# Patient Record
Sex: FEMALE | Race: OTHER | HISPANIC OR LATINO | URBAN - METROPOLITAN AREA
[De-identification: names, ages, dates, MRNs, and addresses within clinical notes are randomized per-mention and may not be internally consistent; named-entity substitution may affect disease eponyms.]

---

## 2021-04-12 ENCOUNTER — EMERGENCY (EMERGENCY)
Facility: HOSPITAL | Age: 36
LOS: 1 days | Discharge: HOME | End: 2021-04-12
Attending: STUDENT IN AN ORGANIZED HEALTH CARE EDUCATION/TRAINING PROGRAM
Payer: COMMERCIAL

## 2021-04-12 VITALS — DIASTOLIC BLOOD PRESSURE: 101 MMHG | SYSTOLIC BLOOD PRESSURE: 156 MMHG

## 2021-04-12 VITALS
WEIGHT: 158.07 LBS | RESPIRATION RATE: 20 BRPM | HEIGHT: 67 IN | HEART RATE: 68 BPM | TEMPERATURE: 98 F | DIASTOLIC BLOOD PRESSURE: 105 MMHG | SYSTOLIC BLOOD PRESSURE: 175 MMHG | OXYGEN SATURATION: 99 %

## 2021-04-12 LAB
APPEARANCE UR: CLEAR — SIGNIFICANT CHANGE UP
BILIRUB UR-MCNC: NEGATIVE — SIGNIFICANT CHANGE UP
COLOR SPEC: YELLOW — SIGNIFICANT CHANGE UP
DIFF PNL FLD: NEGATIVE — SIGNIFICANT CHANGE UP
GLUCOSE UR QL: NEGATIVE MG/DL — SIGNIFICANT CHANGE UP
HCG UR QL: NEGATIVE — SIGNIFICANT CHANGE UP
KETONES UR-MCNC: NEGATIVE — SIGNIFICANT CHANGE UP
LEUKOCYTE ESTERASE UR-ACNC: NEGATIVE — SIGNIFICANT CHANGE UP
NITRITE UR-MCNC: NEGATIVE — SIGNIFICANT CHANGE UP
PH UR: 7 — SIGNIFICANT CHANGE UP (ref 5–8)
PROT UR-MCNC: NEGATIVE MG/DL — SIGNIFICANT CHANGE UP
SP GR SPEC: 1.01 — SIGNIFICANT CHANGE UP (ref 1.01–1.02)
UROBILINOGEN FLD QL: NEGATIVE MG/DL — SIGNIFICANT CHANGE UP

## 2021-04-12 PROCEDURE — 87086 URINE CULTURE/COLONY COUNT: CPT

## 2021-04-12 PROCEDURE — 99283 EMERGENCY DEPT VISIT LOW MDM: CPT

## 2021-04-12 PROCEDURE — 81025 URINE PREGNANCY TEST: CPT

## 2021-04-12 PROCEDURE — 81003 URINALYSIS AUTO W/O SCOPE: CPT

## 2021-04-12 PROCEDURE — 99284 EMERGENCY DEPT VISIT MOD MDM: CPT

## 2021-04-12 RX ORDER — LIDOCAINE 4 G/100G
1 CREAM TOPICAL ONCE
Refills: 0 | Status: COMPLETED | OUTPATIENT
Start: 2021-04-12 | End: 2021-04-12

## 2021-04-12 RX ORDER — LIDOCAINE 4 G/100G
1 CREAM TOPICAL
Qty: 10 | Refills: 0
Start: 2021-04-12

## 2021-04-12 RX ADMIN — LIDOCAINE 1 PATCH: 4 CREAM TOPICAL at 10:58

## 2021-04-12 NOTE — ED PROVIDER NOTE - NS ED ROS FT
Constitutional: no fever, no chills  Eyes: no vision changes  ENT: no nasal congestion, no sore throat  CV: no chest pain  Resp: no cough, no shortness of breath  GI: no abdominal pain, no vomiting, no diarrhea  : no dysuria  MSK: no joint pain, +back pain  Skin: no rash  Neuro: no headache, no weakness, no paresthesias

## 2021-04-12 NOTE — ED PROVIDER NOTE - PHYSICAL EXAMINATION
Constitutional: Awake, alert, in no acute distress  Eyes: no scleral icterus  HENT: normocephalic, atraumatic  Neck: supple  CV: RRR, no murmur  Pulm: non-labored respirations, CTAB  Abdomen: soft, non-tender, non-distended  Back: mild tenderness over lumbar area, no stepoff or deformity  Extremities: no edema, no deformity  Skin: no rash, no jaundice  Neuro: AAOx3, moving all extremities equally, 5/5 strength and sensation in b/l lower extremities

## 2021-04-12 NOTE — ED PROVIDER NOTE - PATIENT PORTAL LINK FT
You can access the FollowMyHealth Patient Portal offered by [facility] by registering at the following website: [email or web address]. By joining [employer]’s FollowMyHealth portal, you will also be able to view your health information using other applications (apps) compatible with our system.

## 2021-04-12 NOTE — ED PROVIDER NOTE - CLINICAL SUMMARY MEDICAL DECISION MAKING FREE TEXT BOX
35y F w/ DM, presenting for 2 months of intermittent low back pain, worse today.  Pain improved with tylenol.  Pt in no distress, no red flag symptoms.  Will check UA, treat with lidocaine patch.

## 2021-04-12 NOTE — ED PROVIDER NOTE - CARE PROVIDER_API CALL
Joaquim Darden (DO)  Orthopaedic Surgery  [address] [place] [address], [address]  [place] [address]  Phone: [phone]  Fax: [phone]  Follow Up Time:

## 2021-04-12 NOTE — ED PROVIDER NOTE - NSFOLLOWUPINSTRUCTIONS_ED_ALL_ED_FT
- FOLLOW UP WITH YOUR PRIMARY CARE DOCTOR AND WITH SPINE DOCTOR  - MAY TAKE TYLENOL AND/OR IBUPROFEN AS NEEDED FOR PAIN  - RETURN TO THE EMERGENCY ROOM FOR ANY NEW SYMPTOMS INCLUDING AS LEG WEAKNESS/NUMBNESS, DIFFICULTY GOING TO THE BATHROOM, FEVER.    ----------------------------------      Distensión lumbar    LO QUE NECESITA SABER:    ¿Qué es la distensión lumbar?La distensión lumbar es esther lesión en los músculos o tendones de la parte inferior de la espalda. Los tendones son los tejidos juan jose que conectan a los músculos con los huesos. La parte inferior de la espalda sostiene la mayor parte de holland peso corporal y facilita holland habilidad de moverse, torcerse y doblarse.    ¿Qué ocasiona la distensión lumbar?La distensión lumbar por lo general es provocada por actividades que aumentan la tensión en la parte inferior de la espalda chet el ejercicio o esther lesión. Los siguientes podrían aumentar holland riesgo para esther distensión lumbar:   •Usted sufrió anteriormente esther distensión lumbar.      •Usted levanta objetos pesados con holland espalda en vez de usar tiny piernas.      •Usted no hace calentamiento antes de hacer ejercicio.      •Usted pasa mucho tiempo parado o sentado.      •Usted tiene sobrepeso.      ¿Cuáles son los signos y síntomas de la distensión lumbar?  •Dolor en la parte inferior de la espalda o espasmos musculares      •Rigidez o movimiento limitado      •Dolor que se desplaza hacia los glúteos, katina o las piernas      •Dolor que empeora con la actividad      ¿Cómo se diagnostica la distensión lumbar?Podría realizarse esther radiografía, esther tomografía computarizada (TC) o esther imagen por resonancia magnética (IRM) para revisar si hay daño en holland columna, músculos o tendones. Es posible que le administren líquido de contraste para que la parte inferior de holland espalda se silvina mejor en las imágenes. Dígale al médico si usted alguna vez ha tenido esther reacción alérgica al líquido de contraste. No entre a la kelby donde se realiza la resonancia magnética con algo de metal. El metal puede causar lesiones serias. Dígale al médico si usted tiene algo de metal dentro de holland cuerpo o por encima.    ¿Cómo se trata la distensión lumbar?  •Acetaminofénalivia el dolor y baja la fiebre. Está disponible sin receta médica. Pregunte la cantidad y la frecuencia con que debe tomarlos. Siga las indicaciones. El acetaminofén puede causar daño en el hígado cuando no se marquita de forma correcta.      •Los ADÁN,chet el ibuprofeno, ayudan a disminuir la inflamación, el dolor y la fiebre. Cullen medicamento está disponible con o sin esther receta médica. Los ADÁN pueden causar sangrado estomacal o problemas renales en ciertas personas. Si usted marquita un medicamento anticoagulante, siempre pregúntele a holland médico si los ADÁN son seguros para usted. Siempre mary la etiqueta de cullen medicamento y siga las instrucciones.      •Relajantes muscularesayudan a reducir dolor y espasmos musculares.      •Puede administrarsepodrían administrarse. Pregunte cómo cindy estos medicamentos de esther forma underwood.      •La cirugíaPodría necesitarse esther cirugíasi holland distensión es severa.      ¿Cómo puedo controlar los síntomas?  •El descansosegún las indicaciones. Es probable que necesite descansar en cama jong un tiempo después de lesionarse. No levante objetos pesados.      •Aplique hieloen la espalda de 15 a 20 minutos cada hora o chet se le indique. Use esther compresa de hielo o ponga hielo triturado en esther bolsa de plástico. Cúbrala con esther toalla. El hielo ayuda a evitar daño al tejido y a disminuir la inflamación y el dolor.      •La aplicación de caloren la parte inferior de la espalda de 20 a 30 minutos cada 2 horas jong los días que le indiquen. El calor ayuda a disminuir el dolor y los espasmos musculares.      •Comience lentamente a aumentar holland nivel de actividadconforme disminuya el dolor o chet se lo indiquen.      ¿Cómo se puede evitar la distensión lumbar?  •Use movimientos corporales correctos.?Flexione la cadera y las rodillas cuando vaya a levantar un objeto. No doble la cintura. Utilice los músculos de las piernas mientras levanta holland carga. No use holland espalda. Mantenga el objeto cerca de holland pecho mientras lo levanta. No se tuerza, ni levante cualquier cosa por encima de holland cintura.      ?Cambie holland posición frecuentemente cuando pase mucho tiempo de pie. Descanse un pie sobre esther caja pequeña o un reposapiés e intercambie con el otro pie frecuentemente.      ?No permanezca sentado por lapsos de tiempo prolongados. Cuando sea necesario hacerlo, siéntese en esther silla de respaldo recto con los pies apoyados en el suelo.      ?Nunca alcance, jale ni empuje mientras se encuentra sentando.      •Entre en calor antes de hacer ejercicio.Pratik ejercicios que fortalezcan tiny músculos de la espalda. Pregunte a holland médico acerca del mejor plan de ejercicio para usted.      •Mantenga un peso saludable.Consulte con holland médico cuánto debería pesar. Pida que le ayude a crear un plan para bajar de peso si usted tiene sobrepeso.      ¿Cuándo tim buscar atención inmediata?  •Usted oye o siente un estallido en la parte inferior de la espalda.      •Usted tiene mayor inflamación o dolor en la parte inferior de holland espalda.      •Usted tiene dificultad para  las piernas.      •Tiny piernas están entumecidas.      ¿Cuándo tim comunicarme con mi médico?  •Tiene fiebre.      •El dolor no desaparece, incluso después del tratamiento.      •Usted tiene preguntas o inquietudes acerca de holland condición o cuidado.      ACUERDOS SOBRE HOLLAND CUIDADO:    Usted tiene el derecho de ayudar a planear holland cuidado. Aprenda todo lo que pueda sobre holland condición y chet darle tratamiento. Discuta tiny opciones de tratamiento con tiny médicos para decidir el cuidado que usted desea recibir. Usted siempre tiene el derecho de rechazar el tratamiento.

## 2021-04-12 NOTE — ED PROVIDER NOTE - ATTENDING CONTRIBUTION TO CARE
34yo female with pmh of DM on insulin pump presents with back pain. Pt states for the past 2 months with intermittent lower back pain at times feels radiation down her right thigh. Pt also states that at time has suprapubic discomfort. PT was work today and fel tthe pain come on, pt took tyelnol with resolution of most of her pain. Pt denies trauma, falls, fevers/chills, ha, loc, focal neuro deficits, cp/sob/palp, cough, abd pain/n/v/d, urinary symptoms, recent travel and sick contacts.  Const: Awake, alert and oriented. In no acute distress. Well appearing.  HEENT: NC/AT. Moist mucous membranes.  Eyes: No scleral icterus. EOMI.  Neck:. Soft and supple. Full ROM without pain.  Cardiac: Regular rate and regular rhythm. +S1/S2. Peripheral pulses 2+ and symmetric. No LE edema.  Resp: Speaking in full sentences. No evidence of respiratory distress. No wheezes, rales or rhonchi.  Abd: Soft, non-tender, non-distended. Normal bowel sounds in all 4 quadrants. No guarding or rebound.  Back: Spine midline and non-tender. No CVAT.  Skin: No rashes, abrasions or lacerations.  Lymph: No cervical lymphadenopathy.  Neuro: Awake, alert & oriented x 3. Moves all extremities symmetrically. A&Ox3, moving all extremities symmetrically, follows commands, motor johan upper and lower ext 5/5, sensory symm and intact CN 2-12 grossly intact, no ataxia, no nystagmus, no dysmetria, no ddk, symm johan, no pronator drift  Pt denies IVDU, bladder/bowel incontinence, saddle anesthesia, neuro deficits. No midline spine tenderness, no step offs. + Paraspinal ttp. Likely msk pain, analgesia, reassess

## 2021-04-12 NOTE — ED PROVIDER NOTE - OBJECTIVE STATEMENT
35y F w/ hx DM (on insulin pump), presenting for back pain.  Pt reports intermittent low back pain for the past 2 months, sometimes radiating to the R thigh.  Associated with intermittent suprapubic discomfort.  Denies trauma, fever, chills, vomiting, numbness, weakness, bowel/bladder dysfunction, hx IVDA.  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  She took tylenol with improvement at this time.

## 2021-04-13 LAB
CULTURE RESULTS: SIGNIFICANT CHANGE UP
SPECIMEN SOURCE: SIGNIFICANT CHANGE UP

## 2023-01-15 ENCOUNTER — EMERGENCY (EMERGENCY)
Facility: HOSPITAL | Age: 38
LOS: 1 days | Discharge: HOME | End: 2023-01-15
Attending: EMERGENCY MEDICINE
Payer: COMMERCIAL

## 2023-01-15 VITALS
WEIGHT: 160.06 LBS | RESPIRATION RATE: 20 BRPM | TEMPERATURE: 99 F | OXYGEN SATURATION: 99 % | SYSTOLIC BLOOD PRESSURE: 142 MMHG | DIASTOLIC BLOOD PRESSURE: 101 MMHG | HEART RATE: 120 BPM

## 2023-01-15 PROCEDURE — 99285 EMERGENCY DEPT VISIT HI MDM: CPT

## 2023-01-15 RX ORDER — ONDANSETRON 8 MG/1
4 TABLET, FILM COATED ORAL ONCE
Refills: 0 | Status: COMPLETED | OUTPATIENT
Start: 2023-01-15 | End: 2023-01-15

## 2023-01-15 RX ORDER — SODIUM CHLORIDE 9 MG/ML
1000 INJECTION INTRAMUSCULAR; INTRAVENOUS; SUBCUTANEOUS ONCE
Refills: 0 | Status: COMPLETED | OUTPATIENT
Start: 2023-01-15 | End: 2023-01-15

## 2023-01-15 RX ADMIN — ONDANSETRON 4 MILLIGRAM(S): 8 TABLET, FILM COATED ORAL at 23:53

## 2023-01-15 NOTE — ED ADULT TRIAGE NOTE - CHIEF COMPLAINT QUOTE
C/O Nausea and vomiting for the past day. Unable to tolerate PO intake. +weakness. Hx of DM. FS in triage is 278.

## 2023-01-16 VITALS
OXYGEN SATURATION: 99 % | SYSTOLIC BLOOD PRESSURE: 120 MMHG | DIASTOLIC BLOOD PRESSURE: 84 MMHG | RESPIRATION RATE: 18 BRPM | HEART RATE: 80 BPM

## 2023-01-16 LAB
ALBUMIN SERPL ELPH-MCNC: 3.7 G/DL — SIGNIFICANT CHANGE UP (ref 3.3–5.2)
ALP SERPL-CCNC: 74 U/L — SIGNIFICANT CHANGE UP (ref 40–120)
ALT FLD-CCNC: 9 U/L — SIGNIFICANT CHANGE UP
ANION GAP SERPL CALC-SCNC: 9 MMOL/L — SIGNIFICANT CHANGE UP (ref 5–17)
APPEARANCE UR: CLEAR — SIGNIFICANT CHANGE UP
AST SERPL-CCNC: 17 U/L — SIGNIFICANT CHANGE UP
BASE EXCESS BLDV CALC-SCNC: 3.1 MMOL/L — HIGH (ref -2–3)
BASOPHILS # BLD AUTO: 0.03 K/UL — SIGNIFICANT CHANGE UP (ref 0–0.2)
BASOPHILS NFR BLD AUTO: 0.9 % — SIGNIFICANT CHANGE UP (ref 0–2)
BILIRUB SERPL-MCNC: 0.2 MG/DL — LOW (ref 0.4–2)
BILIRUB UR-MCNC: NEGATIVE — SIGNIFICANT CHANGE UP
BUN SERPL-MCNC: 9.6 MG/DL — SIGNIFICANT CHANGE UP (ref 8–20)
CA-I SERPL-SCNC: 1.18 MMOL/L — SIGNIFICANT CHANGE UP (ref 1.15–1.33)
CALCIUM SERPL-MCNC: 8.5 MG/DL — SIGNIFICANT CHANGE UP (ref 8.4–10.5)
CHLORIDE BLDV-SCNC: 100 MMOL/L — SIGNIFICANT CHANGE UP (ref 96–108)
CHLORIDE SERPL-SCNC: 97 MMOL/L — SIGNIFICANT CHANGE UP (ref 96–108)
CO2 SERPL-SCNC: 26 MMOL/L — SIGNIFICANT CHANGE UP (ref 22–29)
COLOR SPEC: YELLOW — SIGNIFICANT CHANGE UP
CREAT SERPL-MCNC: 0.51 MG/DL — SIGNIFICANT CHANGE UP (ref 0.5–1.3)
DIFF PNL FLD: NEGATIVE — SIGNIFICANT CHANGE UP
EGFR: 122 ML/MIN/1.73M2 — SIGNIFICANT CHANGE UP
EOSINOPHIL # BLD AUTO: 0.03 K/UL — SIGNIFICANT CHANGE UP (ref 0–0.5)
EOSINOPHIL NFR BLD AUTO: 0.9 % — SIGNIFICANT CHANGE UP (ref 0–6)
FLUAV AG NPH QL: SIGNIFICANT CHANGE UP
FLUBV AG NPH QL: SIGNIFICANT CHANGE UP
GAS PNL BLDV: 132 MMOL/L — LOW (ref 136–145)
GAS PNL BLDV: SIGNIFICANT CHANGE UP
GLUCOSE BLDV-MCNC: 289 MG/DL — HIGH (ref 70–99)
GLUCOSE SERPL-MCNC: 279 MG/DL — HIGH (ref 70–99)
GLUCOSE UR QL: 1000 MG/DL
HCO3 BLDV-SCNC: 29 MMOL/L — SIGNIFICANT CHANGE UP (ref 22–29)
HCT VFR BLD CALC: 35.9 % — SIGNIFICANT CHANGE UP (ref 34.5–45)
HCT VFR BLDA CALC: 38 % — SIGNIFICANT CHANGE UP
HGB BLD CALC-MCNC: 12.5 G/DL — SIGNIFICANT CHANGE UP (ref 11.7–16.1)
HGB BLD-MCNC: 11.9 G/DL — SIGNIFICANT CHANGE UP (ref 11.5–15.5)
KETONES UR-MCNC: ABNORMAL
LACTATE BLDV-MCNC: 0.9 MMOL/L — SIGNIFICANT CHANGE UP (ref 0.5–2)
LEUKOCYTE ESTERASE UR-ACNC: NEGATIVE — SIGNIFICANT CHANGE UP
LIDOCAIN IGE QN: 13 U/L — LOW (ref 22–51)
LYMPHOCYTES # BLD AUTO: 1.05 K/UL — SIGNIFICANT CHANGE UP (ref 1–3.3)
LYMPHOCYTES # BLD AUTO: 32.7 % — SIGNIFICANT CHANGE UP (ref 13–44)
MANUAL SMEAR VERIFICATION: SIGNIFICANT CHANGE UP
MCHC RBC-ENTMCNC: 29.8 PG — SIGNIFICANT CHANGE UP (ref 27–34)
MCHC RBC-ENTMCNC: 33.1 GM/DL — SIGNIFICANT CHANGE UP (ref 32–36)
MCV RBC AUTO: 89.8 FL — SIGNIFICANT CHANGE UP (ref 80–100)
MONOCYTES # BLD AUTO: 0.34 K/UL — SIGNIFICANT CHANGE UP (ref 0–0.9)
MONOCYTES NFR BLD AUTO: 10.6 % — SIGNIFICANT CHANGE UP (ref 2–14)
NEUTROPHILS # BLD AUTO: 1.67 K/UL — LOW (ref 1.8–7.4)
NEUTROPHILS NFR BLD AUTO: 52.2 % — SIGNIFICANT CHANGE UP (ref 43–77)
NITRITE UR-MCNC: NEGATIVE — SIGNIFICANT CHANGE UP
PCO2 BLDV: 52 MMHG — HIGH (ref 39–42)
PH BLDV: 7.35 — SIGNIFICANT CHANGE UP (ref 7.32–7.43)
PH UR: 7 — SIGNIFICANT CHANGE UP (ref 5–8)
PLAT MORPH BLD: NORMAL — SIGNIFICANT CHANGE UP
PLATELET # BLD AUTO: 181 K/UL — SIGNIFICANT CHANGE UP (ref 150–400)
PO2 BLDV: 49 MMHG — HIGH (ref 25–45)
POTASSIUM BLDV-SCNC: 4.2 MMOL/L — SIGNIFICANT CHANGE UP (ref 3.5–5.1)
POTASSIUM SERPL-MCNC: 4.1 MMOL/L — SIGNIFICANT CHANGE UP (ref 3.5–5.3)
POTASSIUM SERPL-SCNC: 4.1 MMOL/L — SIGNIFICANT CHANGE UP (ref 3.5–5.3)
PROT SERPL-MCNC: 6.3 G/DL — LOW (ref 6.6–8.7)
PROT UR-MCNC: NEGATIVE — SIGNIFICANT CHANGE UP
RBC # BLD: 4 M/UL — SIGNIFICANT CHANGE UP (ref 3.8–5.2)
RBC # FLD: 11.5 % — SIGNIFICANT CHANGE UP (ref 10.3–14.5)
RBC BLD AUTO: NORMAL — SIGNIFICANT CHANGE UP
RSV RNA NPH QL NAA+NON-PROBE: SIGNIFICANT CHANGE UP
SAO2 % BLDV: 76 % — SIGNIFICANT CHANGE UP
SARS-COV-2 RNA SPEC QL NAA+PROBE: SIGNIFICANT CHANGE UP
SMUDGE CELLS # BLD: PRESENT — SIGNIFICANT CHANGE UP
SODIUM SERPL-SCNC: 132 MMOL/L — LOW (ref 135–145)
SP GR SPEC: 1.01 — SIGNIFICANT CHANGE UP (ref 1.01–1.02)
UROBILINOGEN FLD QL: 4 MG/DL
VARIANT LYMPHS # BLD: 2.7 % — SIGNIFICANT CHANGE UP (ref 0–6)
WBC # BLD: 3.2 K/UL — LOW (ref 3.8–10.5)
WBC # FLD AUTO: 3.2 K/UL — LOW (ref 3.8–10.5)

## 2023-01-16 RX ORDER — INSULIN LISPRO 100/ML
10 VIAL (ML) SUBCUTANEOUS ONCE
Refills: 0 | Status: COMPLETED | OUTPATIENT
Start: 2023-01-16 | End: 2023-01-16

## 2023-01-16 RX ORDER — KETOROLAC TROMETHAMINE 30 MG/ML
15 SYRINGE (ML) INJECTION ONCE
Refills: 0 | Status: DISCONTINUED | OUTPATIENT
Start: 2023-01-16 | End: 2023-01-16

## 2023-01-16 RX ORDER — FAMOTIDINE 10 MG/ML
20 INJECTION INTRAVENOUS ONCE
Refills: 0 | Status: COMPLETED | OUTPATIENT
Start: 2023-01-16 | End: 2023-01-16

## 2023-01-16 RX ORDER — PANTOPRAZOLE SODIUM 20 MG/1
1 TABLET, DELAYED RELEASE ORAL
Qty: 14 | Refills: 0
Start: 2023-01-16 | End: 2023-01-29

## 2023-01-16 RX ORDER — ONDANSETRON 8 MG/1
1 TABLET, FILM COATED ORAL
Qty: 20 | Refills: 0
Start: 2023-01-16

## 2023-01-16 RX ORDER — ACETAMINOPHEN 500 MG
975 TABLET ORAL ONCE
Refills: 0 | Status: COMPLETED | OUTPATIENT
Start: 2023-01-16 | End: 2023-01-16

## 2023-01-16 RX ORDER — SODIUM CHLORIDE 9 MG/ML
1000 INJECTION INTRAMUSCULAR; INTRAVENOUS; SUBCUTANEOUS ONCE
Refills: 0 | Status: COMPLETED | OUTPATIENT
Start: 2023-01-16 | End: 2023-01-16

## 2023-01-16 RX ADMIN — SODIUM CHLORIDE 1000 MILLILITER(S): 9 INJECTION INTRAMUSCULAR; INTRAVENOUS; SUBCUTANEOUS at 00:55

## 2023-01-16 RX ADMIN — FAMOTIDINE 20 MILLIGRAM(S): 10 INJECTION INTRAVENOUS at 00:55

## 2023-01-16 RX ADMIN — Medication 15 MILLIGRAM(S): at 06:35

## 2023-01-16 RX ADMIN — SODIUM CHLORIDE 1000 MILLILITER(S): 9 INJECTION INTRAMUSCULAR; INTRAVENOUS; SUBCUTANEOUS at 00:30

## 2023-01-16 RX ADMIN — Medication 10 UNIT(S): at 06:14

## 2023-01-16 RX ADMIN — Medication 975 MILLIGRAM(S): at 06:35

## 2023-01-16 NOTE — ED ADULT NURSE REASSESSMENT NOTE - NS ED NURSE REASSESS COMMENT FT1
Pt sleeping, arouses to voice, denies HA, dizziness, SOB, CP, pain, N/V/D, palpitations, chills at this time. Pt Aox4, speaking coherently, respirations even and unlabored on RA, skin warm and dry.

## 2023-01-16 NOTE — ED PROVIDER NOTE - CLINICAL SUMMARY MEDICAL DECISION MAKING FREE TEXT BOX
Nonspecific nausea and poor appetite, no focal abdominal pain or tenderness to suggest obstruction or other acute surgical process, could be flu or other viral syndrome given sick contact. Hyperglycemic with some ketones in the urine but no HAGMA. Fluids given IV with subcut insulin, FS trending down. Symptoms managed with acid suppression and antiemetics. Will provide course of the same at home, return precautions provided.

## 2023-01-16 NOTE — ED PROVIDER NOTE - PATIENT PORTAL LINK FT
You can access the FollowMyHealth Patient Portal offered by [facility] by registering at the following website: [email or web address]. By joining [employer]’s FollowMyHealth portal, you will also be able to view your health information using other applications (apps) compatible with our system. You can access the FollowMyHealth Patient Portal offered by [facility] by registering at the following website: [email or web address]. By joining [employer]’s FollowMyHealth portal, you will also be able to view your health information using other applications (apps) compatible with our system. You can access the FollowMyHealth Patient Portal offered by [facility] by registering at the following website: [email or web address]. By joining [employer]’s FollowMyHealth portal, you will also be able to view your health information using other applications (apps) compatible with our system.

## 2023-01-16 NOTE — ED ADULT NURSE REASSESSMENT NOTE - NS ED NURSE REASSESS COMMENT FT1
Pt awake and alert, stated she has a slight GASTELUM, Dr. Daley made aware, see new orders. Pt denies dizziness, SOB, N/V/D, CP, palpitations, chills at this time. Pt Aox4,speaking coherently, respirations even and unlabored on RA, skin warm and dry.

## 2023-01-16 NOTE — ED PROVIDER NOTE - OBJECTIVE STATEMENT
37yof with DM on basal/bolus insulin has had 2 days of poor appetite, nausea and PO intolerance, associated with some polyuria and high glucose despite adherence to insulin. Denies any URI symptoms or abd pain. Son has influenza A.

## 2023-01-16 NOTE — ED ADULT NURSE NOTE - NSIMPLEMENTINTERV_GEN_ALL_ED
Implemented All Universal Safety Interventions:  [place] to call system. Call bell, personal items and telephone within reach. Instruct patient to call for assistance. Room bathroom lighting operational. Non-slip footwear when patient is off stretcher. Physically safe environment: no spills, clutter or unnecessary equipment. Stretcher in lowest position, wheels locked, appropriate side rails in place. Implemented All Universal Safety Interventions:  [place] to call system. Call bell, personal items and telephone within reach. Instruct patient to call for assistance. Room bathroom lighting operational. Non-slip footwear when patient is off stretcher. Physically safe environment: no spills, clutter or unnecessary equipment. Stretcher in lowest position, wheels locked, appropriate side rails in place. Implemented All Universal Safety Interventions:  [place] to call system. Call bell, personal items and telephone within reach. Instruct patient to call for assistance. Room bathroom lighting operational. Non-slip footwear when patient is off stretcher. Physically safe environment: no spills, clutter or unnecessary equipment. Stretcher in lowest position, wheels locked, appropriate side rails in place.

## 2023-01-16 NOTE — ED ADULT NURSE NOTE - CAS ELECT INFOMATION PROVIDED
Pharmacy confirmed with pt. Pt stated she is feeling better, denies HA, dizziness, SOB, N/V/D, CP, palpitations at this time.

## 2023-01-16 NOTE — ED PROVIDER NOTE - TEST CONSIDERED BUT NOT PERFORMED
Tests Considered But Not Performed CT abdomen/pelvis- no focal abdominal pain or tenderness, symptoms managed with conservative measures

## 2023-01-16 NOTE — ED ADULT NURSE NOTE - OBJECTIVE STATEMENT
c/o vomiting x 2 days. Pt stated she ate normally yesterday. Exposure to flu, son flu +. Pt denies HA, dizziness, SOB, N/V/D, CP, palpitations, chills at this time. Pt Aox4, speaking coherently, respirations even and unlabored on RA, skin warm and dry.

## 2023-01-17 LAB
CULTURE RESULTS: SIGNIFICANT CHANGE UP
SPECIMEN SOURCE: SIGNIFICANT CHANGE UP

## 2023-02-06 NOTE — ED PROVIDER NOTE - NSFOLLOWUPINSTRUCTIONS_ED_ALL_ED_FT
(E4) spontaneous Follow up with your primary doctor as soon as possible  Continue pantoprazole as prescribed  Use ondansetron 4mg as needed every 4-6 hours for nausea  Return to the ER with any new, worsening or persistent symptoms.

## 2023-06-12 PROBLEM — Z00.00 ENCOUNTER FOR PREVENTIVE HEALTH EXAMINATION: Status: ACTIVE | Noted: 2023-06-12

## 2023-06-13 ENCOUNTER — APPOINTMENT (OUTPATIENT)
Dept: OBGYN | Facility: CLINIC | Age: 38
End: 2023-06-13

## 2023-06-20 ENCOUNTER — APPOINTMENT (OUTPATIENT)
Dept: OBGYN | Facility: CLINIC | Age: 38
End: 2023-06-20

## 2023-07-24 ENCOUNTER — APPOINTMENT (OUTPATIENT)
Dept: OBGYN | Facility: CLINIC | Age: 38
End: 2023-07-24
Payer: MEDICAID

## 2023-07-24 VITALS
WEIGHT: 153.4 LBS | SYSTOLIC BLOOD PRESSURE: 141 MMHG | HEIGHT: 66 IN | DIASTOLIC BLOOD PRESSURE: 92 MMHG | BODY MASS INDEX: 24.65 KG/M2

## 2023-07-24 DIAGNOSIS — Z82.49 FAMILY HISTORY OF ISCHEMIC HEART DISEASE AND OTHER DISEASES OF THE CIRCULATORY SYSTEM: ICD-10-CM

## 2023-07-24 DIAGNOSIS — N76.3 SUBACUTE AND CHRONIC VULVITIS: ICD-10-CM

## 2023-07-24 DIAGNOSIS — Z80.42 FAMILY HISTORY OF MALIGNANT NEOPLASM OF PROSTATE: ICD-10-CM

## 2023-07-24 DIAGNOSIS — Z86.79 PERSONAL HISTORY OF OTHER DISEASES OF THE CIRCULATORY SYSTEM: ICD-10-CM

## 2023-07-24 DIAGNOSIS — Z78.9 OTHER SPECIFIED HEALTH STATUS: ICD-10-CM

## 2023-07-24 DIAGNOSIS — B37.31 ACUTE CANDIDIASIS OF VULVA AND VAGINA: ICD-10-CM

## 2023-07-24 DIAGNOSIS — Z86.39 PERSONAL HISTORY OF OTHER ENDOCRINE, NUTRITIONAL AND METABOLIC DISEASE: ICD-10-CM

## 2023-07-24 PROCEDURE — 99204 OFFICE O/P NEW MOD 45 MIN: CPT

## 2023-07-24 RX ORDER — INSULIN GLARGINE 100 [IU]/ML
100 INJECTION, SOLUTION SUBCUTANEOUS
Refills: 0 | Status: ACTIVE | COMMUNITY

## 2023-07-24 RX ORDER — CLOTRIMAZOLE AND BETAMETHASONE DIPROPIONATE 10; .5 MG/G; MG/G
1-0.05 CREAM TOPICAL 3 TIMES DAILY
Qty: 1 | Refills: 2 | Status: ACTIVE | COMMUNITY
Start: 2023-07-24 | End: 1900-01-01

## 2023-07-24 RX ORDER — TERCONAZOLE 4 MG/G
0.4 CREAM VAGINAL DAILY
Qty: 2 | Refills: 3 | Status: ACTIVE | COMMUNITY
Start: 2023-07-24 | End: 1900-01-01

## 2023-07-24 RX ORDER — INSULIN LISPRO 100 U/ML
100 INJECTION, SOLUTION INTRAVENOUS; SUBCUTANEOUS
Refills: 0 | Status: ACTIVE | COMMUNITY

## 2023-07-24 RX ORDER — LOSARTAN POTASSIUM 100 MG/1
100 TABLET, FILM COATED ORAL
Refills: 0 | Status: ACTIVE | COMMUNITY

## 2023-07-24 NOTE — COUNSELING
[Nutrition/ Exercise/ Weight Management] : nutrition, exercise, weight management [Body Image] : body image [Sunscreen] : sunscreen [Drugs/Alcohol] : drugs, alcohol [Breast Self Exam] : breast self exam [Bladder Hygiene] : bladder hygiene [Confidentiality] : confidentiality

## 2023-07-24 NOTE — PHYSICAL EXAM
[Chaperone Present] : A chaperone was present in the examining room during all aspects of the physical examination [FreeTextEntry1] : Tracy [Appropriately responsive] : appropriately responsive [Alert] : alert [No Acute Distress] : no acute distress [No Lymphadenopathy] : no lymphadenopathy [Regular Rate Rhythm] : regular rate rhythm [No Murmurs] : no murmurs [Clear to Auscultation B/L] : clear to auscultation bilaterally [Soft] : soft [Non-tender] : non-tender [Non-distended] : non-distended [No HSM] : No HSM [No Lesions] : no lesions [No Mass] : no mass [Oriented x3] : oriented x3 [Examination Of The Breasts] : a normal appearance [No Masses] : no breast masses were palpable [Vulvitis] : vulvitis [Labia Majora] : normal [Labia Minora] : normal [Discharge] : a  ~M vaginal discharge was present [Moderate] : moderate [White] : white [Cheesy] : cheesy [Serous] : serous [Normal] : normal [Uterine Adnexae] : normal

## 2023-07-24 NOTE — HISTORY OF PRESENT ILLNESS
[Y] : Positive pregnancy history [Menarche Age: ____] : age at menarche was [unfilled] [PGxTotal] : 1 [[facility]] : 2 [PGHxPremature] : 0 [PGHxAbortions] : 0 [[facility]] : 0 [PGHxABInduced] : 0 [PGHxABSpont] : 0 [PGHxEctopic] : 0 [PGHxMultBirths] : 1

## 2023-07-25 LAB
C TRACH RRNA SPEC QL NAA+PROBE: NOT DETECTED
HPV HIGH+LOW RISK DNA PNL CVX: NOT DETECTED
N GONORRHOEA RRNA SPEC QL NAA+PROBE: NOT DETECTED
SOURCE TP AMPLIFICATION: NORMAL

## 2023-07-26 LAB
CANDIDA VAG CYTO: DETECTED
G VAGINALIS+PREV SP MTYP VAG QL MICRO: DETECTED
T VAGINALIS VAG QL WET PREP: NOT DETECTED

## 2023-07-27 LAB — CYTOLOGY CVX/VAG DOC THIN PREP: NORMAL

## 2023-09-19 ENCOUNTER — APPOINTMENT (OUTPATIENT)
Dept: OBGYN | Facility: CLINIC | Age: 38
End: 2023-09-19
Payer: MEDICAID

## 2023-09-19 VITALS
DIASTOLIC BLOOD PRESSURE: 70 MMHG | BODY MASS INDEX: 24.64 KG/M2 | WEIGHT: 153.3 LBS | HEIGHT: 66 IN | SYSTOLIC BLOOD PRESSURE: 110 MMHG

## 2023-09-19 DIAGNOSIS — Z01.419 ENCOUNTER FOR GYNECOLOGICAL EXAMINATION (GENERAL) (ROUTINE) W/OUT ABNORMAL FINDINGS: ICD-10-CM

## 2023-09-19 PROCEDURE — 99214 OFFICE O/P EST MOD 30 MIN: CPT

## 2023-10-31 ENCOUNTER — APPOINTMENT (OUTPATIENT)
Dept: OBGYN | Facility: CLINIC | Age: 38
End: 2023-10-31
Payer: MEDICAID

## 2023-10-31 VITALS
HEIGHT: 66 IN | WEIGHT: 153 LBS | DIASTOLIC BLOOD PRESSURE: 70 MMHG | SYSTOLIC BLOOD PRESSURE: 120 MMHG | BODY MASS INDEX: 24.59 KG/M2

## 2023-10-31 DIAGNOSIS — Z30.017 ENCOUNTER FOR INITIAL PRESCRIPTION OF IMPLANTABLE SUBDERMAL CONTRACEPTIVE: ICD-10-CM

## 2023-10-31 DIAGNOSIS — Z97.5 PRESENCE OF (INTRAUTERINE) CONTRACEPTIVE DEVICE: ICD-10-CM

## 2023-10-31 LAB
HCG UR QL: NEGATIVE
QUALITY CONTROL: YES

## 2023-10-31 PROCEDURE — 11983 REMOVE/INSERT DRUG IMPLANT: CPT

## 2023-10-31 PROCEDURE — 81025 URINE PREGNANCY TEST: CPT

## 2023-11-03 LAB — CORE LAB BIOPSY: NORMAL

## 2023-11-28 ENCOUNTER — APPOINTMENT (OUTPATIENT)
Dept: OBGYN | Facility: CLINIC | Age: 38
End: 2023-11-28

## 2023-12-07 PROBLEM — E11.9 TYPE 2 DIABETES MELLITUS WITHOUT COMPLICATIONS: Chronic | Status: ACTIVE | Noted: 2021-04-12

## 2024-01-03 ENCOUNTER — INPATIENT (INPATIENT)
Facility: HOSPITAL | Age: 39
LOS: 0 days | Discharge: LEFT BEFORE DISCHARGE | DRG: 639 | End: 2024-01-04
Attending: HOSPITALIST | Admitting: HOSPITALIST
Payer: COMMERCIAL

## 2024-01-03 VITALS
RESPIRATION RATE: 18 BRPM | HEIGHT: 67 IN | TEMPERATURE: 98 F | DIASTOLIC BLOOD PRESSURE: 73 MMHG | HEART RATE: 106 BPM | SYSTOLIC BLOOD PRESSURE: 109 MMHG | OXYGEN SATURATION: 99 % | WEIGHT: 146.83 LBS

## 2024-01-03 DIAGNOSIS — E11.10 TYPE 2 DIABETES MELLITUS WITH KETOACIDOSIS WITHOUT COMA: ICD-10-CM

## 2024-01-03 LAB
ACETONE SERPL-MCNC: ABNORMAL
ACETONE SERPL-MCNC: ABNORMAL
ALBUMIN SERPL ELPH-MCNC: 4.4 G/DL — SIGNIFICANT CHANGE UP (ref 3.3–5.2)
ALBUMIN SERPL ELPH-MCNC: 4.4 G/DL — SIGNIFICANT CHANGE UP (ref 3.3–5.2)
ALP SERPL-CCNC: 96 U/L — SIGNIFICANT CHANGE UP (ref 40–120)
ALP SERPL-CCNC: 96 U/L — SIGNIFICANT CHANGE UP (ref 40–120)
ALT FLD-CCNC: 19 U/L — SIGNIFICANT CHANGE UP
ALT FLD-CCNC: 19 U/L — SIGNIFICANT CHANGE UP
ANION GAP SERPL CALC-SCNC: 29 MMOL/L — HIGH (ref 5–17)
ANION GAP SERPL CALC-SCNC: 29 MMOL/L — HIGH (ref 5–17)
AST SERPL-CCNC: 37 U/L — HIGH
AST SERPL-CCNC: 37 U/L — HIGH
BASE EXCESS BLDV CALC-SCNC: -20.9 MMOL/L — LOW (ref -2–3)
BASE EXCESS BLDV CALC-SCNC: -20.9 MMOL/L — LOW (ref -2–3)
BASOPHILS # BLD AUTO: 0 K/UL — SIGNIFICANT CHANGE UP (ref 0–0.2)
BASOPHILS # BLD AUTO: 0 K/UL — SIGNIFICANT CHANGE UP (ref 0–0.2)
BASOPHILS NFR BLD AUTO: 0 % — SIGNIFICANT CHANGE UP (ref 0–2)
BASOPHILS NFR BLD AUTO: 0 % — SIGNIFICANT CHANGE UP (ref 0–2)
BILIRUB SERPL-MCNC: 0.2 MG/DL — LOW (ref 0.4–2)
BILIRUB SERPL-MCNC: 0.2 MG/DL — LOW (ref 0.4–2)
BUN SERPL-MCNC: 14.9 MG/DL — SIGNIFICANT CHANGE UP (ref 8–20)
BUN SERPL-MCNC: 14.9 MG/DL — SIGNIFICANT CHANGE UP (ref 8–20)
CA-I SERPL-SCNC: 1.25 MMOL/L — SIGNIFICANT CHANGE UP (ref 1.15–1.33)
CA-I SERPL-SCNC: 1.25 MMOL/L — SIGNIFICANT CHANGE UP (ref 1.15–1.33)
CALCIUM SERPL-MCNC: 9.2 MG/DL — SIGNIFICANT CHANGE UP (ref 8.4–10.5)
CALCIUM SERPL-MCNC: 9.2 MG/DL — SIGNIFICANT CHANGE UP (ref 8.4–10.5)
CHLORIDE BLDV-SCNC: 95 MMOL/L — LOW (ref 96–108)
CHLORIDE BLDV-SCNC: 95 MMOL/L — LOW (ref 96–108)
CHLORIDE SERPL-SCNC: 90 MMOL/L — LOW (ref 96–108)
CHLORIDE SERPL-SCNC: 90 MMOL/L — LOW (ref 96–108)
CO2 SERPL-SCNC: 10 MMOL/L — CRITICAL LOW (ref 22–29)
CO2 SERPL-SCNC: 10 MMOL/L — CRITICAL LOW (ref 22–29)
CREAT SERPL-MCNC: 0.93 MG/DL — SIGNIFICANT CHANGE UP (ref 0.5–1.3)
CREAT SERPL-MCNC: 0.93 MG/DL — SIGNIFICANT CHANGE UP (ref 0.5–1.3)
EGFR: 80 ML/MIN/1.73M2 — SIGNIFICANT CHANGE UP
EGFR: 80 ML/MIN/1.73M2 — SIGNIFICANT CHANGE UP
EOSINOPHIL # BLD AUTO: 0 K/UL — SIGNIFICANT CHANGE UP (ref 0–0.5)
EOSINOPHIL # BLD AUTO: 0 K/UL — SIGNIFICANT CHANGE UP (ref 0–0.5)
EOSINOPHIL NFR BLD AUTO: 0 % — SIGNIFICANT CHANGE UP (ref 0–6)
EOSINOPHIL NFR BLD AUTO: 0 % — SIGNIFICANT CHANGE UP (ref 0–6)
GAS PNL BLDV: 127 MMOL/L — LOW (ref 136–145)
GAS PNL BLDV: 127 MMOL/L — LOW (ref 136–145)
GAS PNL BLDV: SIGNIFICANT CHANGE UP
GAS PNL BLDV: SIGNIFICANT CHANGE UP
GLUCOSE BLDV-MCNC: 603 MG/DL — CRITICAL HIGH (ref 70–99)
GLUCOSE BLDV-MCNC: 603 MG/DL — CRITICAL HIGH (ref 70–99)
GLUCOSE SERPL-MCNC: 495 MG/DL — CRITICAL HIGH (ref 70–99)
GLUCOSE SERPL-MCNC: 495 MG/DL — CRITICAL HIGH (ref 70–99)
HCG SERPL-ACNC: <4 MIU/ML — SIGNIFICANT CHANGE UP
HCG SERPL-ACNC: <4 MIU/ML — SIGNIFICANT CHANGE UP
HCO3 BLDV-SCNC: 10 MMOL/L — CRITICAL LOW (ref 22–29)
HCO3 BLDV-SCNC: 10 MMOL/L — CRITICAL LOW (ref 22–29)
HCT VFR BLD CALC: 43.8 % — SIGNIFICANT CHANGE UP (ref 34.5–45)
HCT VFR BLD CALC: 43.8 % — SIGNIFICANT CHANGE UP (ref 34.5–45)
HCT VFR BLDA CALC: 44 % — SIGNIFICANT CHANGE UP
HCT VFR BLDA CALC: 44 % — SIGNIFICANT CHANGE UP
HGB BLD CALC-MCNC: 14.7 G/DL — SIGNIFICANT CHANGE UP (ref 11.7–16.1)
HGB BLD CALC-MCNC: 14.7 G/DL — SIGNIFICANT CHANGE UP (ref 11.7–16.1)
HGB BLD-MCNC: 14.4 G/DL — SIGNIFICANT CHANGE UP (ref 11.5–15.5)
HGB BLD-MCNC: 14.4 G/DL — SIGNIFICANT CHANGE UP (ref 11.5–15.5)
LACTATE BLDV-MCNC: 2.1 MMOL/L — HIGH (ref 0.5–2)
LACTATE BLDV-MCNC: 2.1 MMOL/L — HIGH (ref 0.5–2)
LYMPHOCYTES # BLD AUTO: 0.5 K/UL — LOW (ref 1–3.3)
LYMPHOCYTES # BLD AUTO: 0.5 K/UL — LOW (ref 1–3.3)
LYMPHOCYTES # BLD AUTO: 6.1 % — LOW (ref 13–44)
LYMPHOCYTES # BLD AUTO: 6.1 % — LOW (ref 13–44)
MANUAL SMEAR VERIFICATION: SIGNIFICANT CHANGE UP
MANUAL SMEAR VERIFICATION: SIGNIFICANT CHANGE UP
MCHC RBC-ENTMCNC: 31.2 PG — SIGNIFICANT CHANGE UP (ref 27–34)
MCHC RBC-ENTMCNC: 31.2 PG — SIGNIFICANT CHANGE UP (ref 27–34)
MCHC RBC-ENTMCNC: 32.9 GM/DL — SIGNIFICANT CHANGE UP (ref 32–36)
MCHC RBC-ENTMCNC: 32.9 GM/DL — SIGNIFICANT CHANGE UP (ref 32–36)
MCV RBC AUTO: 94.8 FL — SIGNIFICANT CHANGE UP (ref 80–100)
MCV RBC AUTO: 94.8 FL — SIGNIFICANT CHANGE UP (ref 80–100)
MONOCYTES # BLD AUTO: 0.64 K/UL — SIGNIFICANT CHANGE UP (ref 0–0.9)
MONOCYTES # BLD AUTO: 0.64 K/UL — SIGNIFICANT CHANGE UP (ref 0–0.9)
MONOCYTES NFR BLD AUTO: 7.8 % — SIGNIFICANT CHANGE UP (ref 2–14)
MONOCYTES NFR BLD AUTO: 7.8 % — SIGNIFICANT CHANGE UP (ref 2–14)
NEUTROPHILS # BLD AUTO: 7.07 K/UL — SIGNIFICANT CHANGE UP (ref 1.8–7.4)
NEUTROPHILS # BLD AUTO: 7.07 K/UL — SIGNIFICANT CHANGE UP (ref 1.8–7.4)
NEUTROPHILS NFR BLD AUTO: 85.2 % — HIGH (ref 43–77)
NEUTROPHILS NFR BLD AUTO: 85.2 % — HIGH (ref 43–77)
NEUTS BAND # BLD: 0.9 % — SIGNIFICANT CHANGE UP (ref 0–8)
NEUTS BAND # BLD: 0.9 % — SIGNIFICANT CHANGE UP (ref 0–8)
PCO2 BLDV: 39 MMHG — SIGNIFICANT CHANGE UP (ref 39–42)
PCO2 BLDV: 39 MMHG — SIGNIFICANT CHANGE UP (ref 39–42)
PH BLDV: 7.02 — CRITICAL LOW (ref 7.32–7.43)
PH BLDV: 7.02 — CRITICAL LOW (ref 7.32–7.43)
PLAT MORPH BLD: NORMAL — SIGNIFICANT CHANGE UP
PLAT MORPH BLD: NORMAL — SIGNIFICANT CHANGE UP
PLATELET # BLD AUTO: 227 K/UL — SIGNIFICANT CHANGE UP (ref 150–400)
PLATELET # BLD AUTO: 227 K/UL — SIGNIFICANT CHANGE UP (ref 150–400)
PO2 BLDV: 57 MMHG — HIGH (ref 25–45)
PO2 BLDV: 57 MMHG — HIGH (ref 25–45)
POIKILOCYTOSIS BLD QL AUTO: SLIGHT — SIGNIFICANT CHANGE UP
POIKILOCYTOSIS BLD QL AUTO: SLIGHT — SIGNIFICANT CHANGE UP
POTASSIUM BLDV-SCNC: 6.8 MMOL/L — CRITICAL HIGH (ref 3.5–5.1)
POTASSIUM BLDV-SCNC: 6.8 MMOL/L — CRITICAL HIGH (ref 3.5–5.1)
POTASSIUM SERPL-MCNC: 6.6 MMOL/L — CRITICAL HIGH (ref 3.5–5.3)
POTASSIUM SERPL-MCNC: 6.6 MMOL/L — CRITICAL HIGH (ref 3.5–5.3)
POTASSIUM SERPL-SCNC: 6.6 MMOL/L — CRITICAL HIGH (ref 3.5–5.3)
POTASSIUM SERPL-SCNC: 6.6 MMOL/L — CRITICAL HIGH (ref 3.5–5.3)
PROT SERPL-MCNC: 8.3 G/DL — SIGNIFICANT CHANGE UP (ref 6.6–8.7)
PROT SERPL-MCNC: 8.3 G/DL — SIGNIFICANT CHANGE UP (ref 6.6–8.7)
RBC # BLD: 4.62 M/UL — SIGNIFICANT CHANGE UP (ref 3.8–5.2)
RBC # BLD: 4.62 M/UL — SIGNIFICANT CHANGE UP (ref 3.8–5.2)
RBC # FLD: 11.9 % — SIGNIFICANT CHANGE UP (ref 10.3–14.5)
RBC # FLD: 11.9 % — SIGNIFICANT CHANGE UP (ref 10.3–14.5)
RBC BLD AUTO: ABNORMAL
RBC BLD AUTO: ABNORMAL
SAO2 % BLDV: 75.4 % — SIGNIFICANT CHANGE UP
SAO2 % BLDV: 75.4 % — SIGNIFICANT CHANGE UP
SODIUM SERPL-SCNC: 129 MMOL/L — LOW (ref 135–145)
SODIUM SERPL-SCNC: 129 MMOL/L — LOW (ref 135–145)
WBC # BLD: 8.21 K/UL — SIGNIFICANT CHANGE UP (ref 3.8–10.5)
WBC # BLD: 8.21 K/UL — SIGNIFICANT CHANGE UP (ref 3.8–10.5)
WBC # FLD AUTO: 8.21 K/UL — SIGNIFICANT CHANGE UP (ref 3.8–10.5)
WBC # FLD AUTO: 8.21 K/UL — SIGNIFICANT CHANGE UP (ref 3.8–10.5)

## 2024-01-03 PROCEDURE — 93010 ELECTROCARDIOGRAM REPORT: CPT

## 2024-01-03 PROCEDURE — 99285 EMERGENCY DEPT VISIT HI MDM: CPT

## 2024-01-03 PROCEDURE — 71046 X-RAY EXAM CHEST 2 VIEWS: CPT | Mod: 26

## 2024-01-03 RX ORDER — ENOXAPARIN SODIUM 100 MG/ML
40 INJECTION SUBCUTANEOUS EVERY 24 HOURS
Refills: 0 | Status: DISCONTINUED | OUTPATIENT
Start: 2024-01-03 | End: 2024-01-04

## 2024-01-03 RX ORDER — ACETAMINOPHEN 500 MG
1000 TABLET ORAL ONCE
Refills: 0 | Status: COMPLETED | OUTPATIENT
Start: 2024-01-03 | End: 2024-01-03

## 2024-01-03 RX ORDER — FAMOTIDINE 10 MG/ML
20 INJECTION INTRAVENOUS DAILY
Refills: 0 | Status: DISCONTINUED | OUTPATIENT
Start: 2024-01-03 | End: 2024-01-04

## 2024-01-03 RX ORDER — CHLORHEXIDINE GLUCONATE 213 G/1000ML
1 SOLUTION TOPICAL
Refills: 0 | Status: DISCONTINUED | OUTPATIENT
Start: 2024-01-03 | End: 2024-01-04

## 2024-01-03 RX ORDER — SODIUM CHLORIDE 9 MG/ML
1000 INJECTION, SOLUTION INTRAVENOUS
Refills: 0 | Status: DISCONTINUED | OUTPATIENT
Start: 2024-01-03 | End: 2024-01-04

## 2024-01-03 RX ORDER — SODIUM CHLORIDE 9 MG/ML
1000 INJECTION, SOLUTION INTRAVENOUS ONCE
Refills: 0 | Status: COMPLETED | OUTPATIENT
Start: 2024-01-03 | End: 2024-01-03

## 2024-01-03 RX ORDER — ONDANSETRON 8 MG/1
4 TABLET, FILM COATED ORAL ONCE
Refills: 0 | Status: COMPLETED | OUTPATIENT
Start: 2024-01-03 | End: 2024-01-03

## 2024-01-03 RX ORDER — INSULIN HUMAN 100 [IU]/ML
6 INJECTION, SOLUTION SUBCUTANEOUS ONCE
Refills: 0 | Status: COMPLETED | OUTPATIENT
Start: 2024-01-03 | End: 2024-01-03

## 2024-01-03 RX ORDER — INSULIN HUMAN 100 [IU]/ML
3 INJECTION, SOLUTION SUBCUTANEOUS
Qty: 50 | Refills: 0 | Status: DISCONTINUED | OUTPATIENT
Start: 2024-01-03 | End: 2024-01-04

## 2024-01-03 RX ADMIN — INSULIN HUMAN 6 UNIT(S): 100 INJECTION, SOLUTION SUBCUTANEOUS at 22:19

## 2024-01-03 RX ADMIN — SODIUM CHLORIDE 1000 MILLILITER(S): 9 INJECTION, SOLUTION INTRAVENOUS at 22:34

## 2024-01-03 RX ADMIN — INSULIN HUMAN 6 UNIT(S)/HR: 100 INJECTION, SOLUTION SUBCUTANEOUS at 22:20

## 2024-01-03 RX ADMIN — SODIUM CHLORIDE 1000 MILLILITER(S): 9 INJECTION, SOLUTION INTRAVENOUS at 20:29

## 2024-01-03 RX ADMIN — ONDANSETRON 4 MILLIGRAM(S): 8 TABLET, FILM COATED ORAL at 20:29

## 2024-01-03 RX ADMIN — SODIUM CHLORIDE 1000 MILLILITER(S): 9 INJECTION, SOLUTION INTRAVENOUS at 21:38

## 2024-01-03 RX ADMIN — Medication 400 MILLIGRAM(S): at 20:29

## 2024-01-03 NOTE — ED PROVIDER NOTE - ATTENDING APP SHARED VISIT CONTRIBUTION OF CARE
The patient seen with ACP immediately for critical conditions    DKA    I, Guerrero Chambers, performed the initial face to face bedside interview with this patient regarding history of present illness, review of symptoms and relevant past medical, social and family history.  I completed an independent physical examination.  I was the initial provider who evaluated this patient. I have signed out the follow up of any pending tests (i.e. labs, radiological studies) to the ACP.  I have communicated the patient’s plan of care and disposition with the ACP.

## 2024-01-03 NOTE — H&P ADULT - RESPIRATORY
clear to auscultation bilaterally/no wheezes/no rales/no rhonchi/no respiratory distress/no use of accessory muscles/good air movement

## 2024-01-03 NOTE — ED PROVIDER NOTE - CHIEF COMPLAINT
The patient is a 38y Female complaining of flu-like symptoms.
movement of extremities grossly intact.

## 2024-01-03 NOTE — H&P ADULT - HISTORY OF PRESENT ILLNESS
37 y/o female with PMHx of type 1 DM and HTN who presents to the ED c/o nausea & vomiting that started this morning. Pt states she had runny nose, body aches, headache, cough and fever (Tmax 101F) that started on 24. Pt went to urgent care yesterday, tested positive for influenza, received Tamiflu. States she has not been able to tolerate anything PO today. Pt denies taking insulin today, as she has not eaten anything and felt very weak to get out of bed. Reports taking 14 units of long acting insulin last night, however has not been monitoring her blood glucose for 2 days due to her device battery . Reports that her cough and runny nose are improving, however still reports weakness and headache now. Denies chest pain, SOB, sore throat, diarrhea, fever, chills, urinary frequency, dysuria, hematuria.   Pt received 3L of LR in the ED, on insulin drip at 6 units/hr    Of note, pt reports she last had an episode of DKA in , when she was misdiagnosed as type 2 diabetic, on oral medication. Had a hospital stay of 4 days in [place]. Pt returned to [facility] in , at which point she was formally dx with type 1 DM and placed on 14 units of long acting insulin nightly and short acting Insulin postprandially.  37 y/o female with PMHx of type 1 DM and HTN who presents to the ED c/o nausea & vomiting that started this morning. Pt states she had runny nose, body aches, headache, cough and fever (Tmax 101F) that started on 24. Pt went to urgent care yesterday, tested positive for influenza, received Tamiflu. States she has not been able to tolerate anything PO today. Pt denies taking insulin today, as she has not eaten anything and felt very weak to get out of bed. Reports taking 14 units of long acting insulin last night, however has not been monitoring her blood glucose for 2 days due to her device battery . Reports that her cough and runny nose are improving, however still reports weakness and headache now. Denies chest pain, SOB, sore throat, diarrhea, fever, chills, urinary frequency, dysuria, hematuria.   Pt received 3L of LR in the ED, on insulin drip at 6 units/hr    Of note, pt reports she last had an episode of DKA in , when she was misdiagnosed as type 2 diabetic, on oral medication. Had a hospital stay of 4 days in [place]. Pt returned to [facility] in , at which point she was formally dx with type 1 DM and placed on 14 units of long acting insulin nightly and short acting Insulin postprandially.  37 y/o female with PMHx of type 1 DM and HTN who presents to the ED c/o nausea & vomiting that started this morning. Pt states she had runny nose, body aches, headache, cough and fever (Tmax 101F) that started on 24. Pt went to urgent care yesterday, tested positive for influenza, received Tamiflu. States she has not been able to tolerate anything PO today. Pt denies taking insulin today, as she has not eaten anything and felt very weak to get out of bed. Reports taking 14 units of long acting insulin last night, however has not been monitoring her blood glucose for 2 days since her device battery . Reports that her cough and runny nose are improving, however still reports weakness and headache now. Denies chest pain, SOB, sore throat, diarrhea, fever, chills, urinary frequency, dysuria, hematuria.     Of note, pt reports she last had an episode of DKA in , when she was misdiagnosed as type 2 diabetic, on oral medication. Had a hospital stay of 4 days in [place]. Pt returned to [facility] in , at which point she was formally dx with type 1 DM and placed on 14 units of long acting insulin nightly and short acting Insulin postprandially.  37 y/o female with PMHx of type 1 DM and HTN who presents to the ED c/o nausea & vomiting that started this morning. Pt states she had runny nose, body aches, headache, cough and fever (Tmax 101F) that started on 24. Pt went to urgent care yesterday, tested positive for influenza, received Tamiflu. States she has not been able to tolerate anything PO today. Pt denies taking insulin today, as she has not eaten anything and felt very weak to get out of bed. Reports taking 14 units of long acting insulin last night, however has not been monitoring her blood glucose for 2 days since her device battery . Reports that her cough and runny nose are improving, however still reports weakness and headache now. Denies chest pain, SOB, sore throat, diarrhea, fever, chills, urinary frequency, dysuria, hematuria.     Of note, pt reports she last had an episode of DKA in , when she was misdiagnosed as type 2 diabetic, on oral medication. Had a hospital stay of 4 days in [place]. Pt returned to [facility] in , at which point she was formally dx with type 1 DM and placed on 14 units of long acting insulin nightly and short acting Insulin postprandially.  39 y/o female with PMHx of type 1 DM and HTN who presents to the ED c/o nausea & vomiting that started this morning. Pt states she had runny nose, body aches, headache, cough and fever (Tmax 101F) that started on 24. Pt went to urgent care yesterday, tested positive for influenza, received Tamiflu. States she has not been able to tolerate anything PO today. Pt denies taking insulin today, as she has not eaten anything and felt very weak to get out of bed. Reports taking 14 units of long acting insulin last night, however has not been monitoring her blood glucose for 2 days since her device battery . Reports that her cough and runny nose are improving, however still reports weakness and headache now. Denies chest pain, SOB, sore throat, diarrhea, fever, chills, urinary frequency, dysuria, hematuria.   Labs upon arrival to ED demonstrated AGMA, with BG of 495, Bicarb 10, AG 29, K 6.6. VBG: pH 7.020, HCO3 10.    Of note, pt reports she last had an episode of DKA in , when she was misdiagnosed as type 2 diabetic, on oral medication. Had a hospital stay of 4 days in [place]. Pt returned to [facility] in , at which point she was formally dx with type 1 DM and placed on 14 units of long acting insulin nightly and short acting Insulin postprandially.  39 y/o female with PMHx of type 1 DM and HTN who presents to the ED c/o nausea & vomiting that started this morning. Pt states she had runny nose, body aches, headache, cough and fever (Tmax 101F) that started on 24. Pt went to urgent care yesterday, tested positive for influenza, received Tamiflu. States she has not been able to tolerate anything PO today. Pt denies taking insulin today, as she has not eaten anything and felt very weak to get out of bed. Reports taking 14 units of long acting insulin last night, however has not been monitoring her blood glucose for 2 days since her device battery . Reports that her cough and runny nose are improving, however still reports weakness and headache now. Denies chest pain, SOB, sore throat, diarrhea, fever, chills, urinary frequency, dysuria, hematuria.   Labs upon arrival to ED demonstrated AGMA, with BG of 495, Bicarb 10, AG 29, K 6.6. VBG: pH 7.020, HCO3 10.    Of note, pt reports she last had an episode of DKA in , when she was misdiagnosed as type 2 diabetic, on oral medication. Had a hospital stay of 4 days in [place]. Pt returned to [facility] in , at which point she was formally dx with type 1 DM and placed on 14 units of long acting insulin nightly and short acting Insulin postprandially.  37 y/o female with PMHx of type 1 DM and HTN who presents to the ED c/o nausea & vomiting that started this morning. Pt states she had runny nose, body aches, headache, cough and fever (Tmax 101F) that started on 24. Pt went to urgent care yesterday, tested positive for influenza, received Tamiflu. States she has not been able to tolerate anything PO today. Pt denies taking insulin today, as she has not eaten anything and felt very weak to get out of bed. Reports taking 14 units of long acting insulin last night, however has not been monitoring her blood glucose for 2 days since her device battery . Reports that her cough and runny nose are improving, however still reports weakness and headache now. Denies chest pain, SOB, sore throat, diarrhea, fever, chills, urinary frequency, dysuria, hematuria.   Labs upon arrival to ED demonstrated AGMA, with BG of 495, Bicarb 10, AG 29, K 6.6. VBG: pH 7.020, HCO3 10, Lactate 2.1. Pt received 3L of LR in the ED, Insulin 6 units IVP once, and started on insulin gtt at 6 units/hr.     Of note, pt reports she last had an episode of DKA in , when she was misdiagnosed as type 2 diabetic, on oral medication. Had a hospital stay of 4 days in [place]. Pt returned to [facility] in , at which point she was formally dx with type 1 DM and placed on 14 units of long acting insulin nightly and short acting Insulin postprandially.  39 y/o female with PMHx of type 1 DM and HTN who presents to the ED c/o nausea & vomiting that started this morning. Pt states she had runny nose, body aches, headache, cough and fever (Tmax 101F) that started on 24. Pt went to urgent care yesterday, tested positive for influenza, received Tamiflu. States she has not been able to tolerate anything PO today. Pt denies taking insulin today, as she has not eaten anything and felt very weak to get out of bed. Reports taking 14 units of long acting insulin last night, however has not been monitoring her blood glucose for 2 days since her device battery . Reports that her cough and runny nose are improving, however still reports weakness and headache now. Denies chest pain, SOB, sore throat, diarrhea, fever, chills, urinary frequency, dysuria, hematuria.   Labs upon arrival to ED demonstrated AGMA, with BG of 495, Bicarb 10, AG 29, K 6.6. VBG: pH 7.020, HCO3 10, Lactate 2.1. Pt received 3L of LR in the ED, Insulin 6 units IVP once, and started on insulin gtt at 6 units/hr.     Of note, pt reports she last had an episode of DKA in , when she was misdiagnosed as type 2 diabetic, on oral medication. Had a hospital stay of 4 days in [place]. Pt returned to [facility] in , at which point she was formally dx with type 1 DM and placed on 14 units of long acting insulin nightly and short acting Insulin postprandially.

## 2024-01-03 NOTE — ED PROVIDER NOTE - CLINICAL SUMMARY MEDICAL DECISION MAKING FREE TEXT BOX
39 yo female with pmhx of T1DM on insulin presents with N/V, subjective fever, body aches, nasal congestionx2 days. pt found to be in DKA. 3L fluids given with improvement in BG. EKG without ischemic changes. insulin bolus and drip started. pt to be admitted to MICU for further management. 37 yo female with pmhx of T1DM on insulin presents with N/V, subjective fever, body aches, nasal congestionx2 days. pt found to be in DKA. 3L fluids given with improvement in BG. EKG without ischemic changes. insulin bolus and drip started. pt to be admitted to MICU for further management.

## 2024-01-03 NOTE — H&P ADULT - NS PANP COMMENT GEN_ALL_CORE FT
38-year-old type I diabetic with history of hypertension with few days of flulike illness with fever headache runny nose body aches diagnosed with influenza A yesterday at urgent care started on Tamiflu with persistent and subsequent worsening nausea and vomiting presented to ED being admitted to medical ICU for diabetic ketoacidosis, influenza A, severe dehydration, hyperkalemia     fluids: S/p  3 L bolus in ED, giving more 1 L LR now f/b Maintenance fluid  Improvement in potassium after introduction of fluids and insulin  Insulin infusion at 6 units per hour with q1 hour blood sugar measurement to target drop in BS by  per hour  Trend BMP, Mg, PO4 every 4 hour and once AG < 18 and asymptomatic patient, will transition to Long acting insulin with pre-meal and also SS short acting insulin   If AG + with BS< 250 will transition IVF to Dextrose mixed IVF with continuation goal directed management   Endocrinology consult   Most likely reason for DKA: influenza A  Diabetic education   Diet:  to be advanced as tolerated with consistent carb   Tamiflu twice daily   as needed antiemetic

## 2024-01-03 NOTE — ED PROVIDER NOTE - OBJECTIVE STATEMENT
39 yo female with pmhx of T1DM on insulin presents with N/V, subjective fever, body aches, nasal congestionx2 days. Pt reports since 1/1/24, hasn't been feeling well. Went to UC yesterday was diagnosed with influenza and was started on tamiflu. States she has been vomiting since yesterday  States from all the vomiting, feels generally weak.   Denies dizziness, LOC, vision changes, cp, palpitations, sob, abd pain, constipation, 39 yo female with pmhx of T1DM on insulin presents with N/V, subjective fever, body aches, nasal congestionx2 days. Pt reports since 1/1/24, hasn't been feeling well. Went to UC yesterday was diagnosed with influenza and was started on tamiflu. States she has been vomiting since yesterday and hasn't been able to tolerate PO today. States she is on insulin but bc she isn't eating, didn't take her insulin today. States from all the vomiting, feels generally weak. Denies dizziness, LOC, vision changes, cp, palpitations, sob, abd pain, diarrhea, dysuria, hematuria, paresthesias in the extremities, rashes.

## 2024-01-03 NOTE — ED ADULT NURSE NOTE - OBJECTIVE STATEMENT
patient complaining headache, fever, nausea, vomiting and generalized body aches. patient recently dx with ful and put on Tamiflu but not getting better.

## 2024-01-03 NOTE — ED PROVIDER NOTE - NS ED ATTENDING STATEMENT MOD
I have personally provided the amount of critical care time documented below excluding time spent on separate procedures. This was a shared visit with the HORTENSIA. I reviewed and verified the documentation.

## 2024-01-03 NOTE — ED PROVIDER NOTE - CRITICAL CARE ATTENDING CONTRIBUTION TO CARE
The patient seen with ACP immediately for critical conditions    DKA    I, Guerrero Chabmers, performed the initial face to face bedside interview with this patient regarding history of present illness, review of symptoms and relevant past medical, social and family history.  I completed an independent physical examination.  I was the initial provider who evaluated this patient. I have signed out the follow up of any pending tests (i.e. labs, radiological studies) to the ACP.  I have communicated the patient’s plan of care and disposition with the ACP. The patient seen with ACP immediately for critical conditions    DKA    I, Guerrero Chambers, performed the initial face to face bedside interview with this patient regarding history of present illness, review of symptoms and relevant past medical, social and family history.  I completed an independent physical examination.  I was the initial provider who evaluated this patient. I have signed out the follow up of any pending tests (i.e. labs, radiological studies) to the ACP.  I have communicated the patient’s plan of care and disposition with the ACP.

## 2024-01-03 NOTE — ED PROVIDER NOTE - PHYSICAL EXAMINATION
Gen: No acute distress, non toxic  HEENT: Mucous membranes moist, pink conjunctivae, EOMI. PERRL. Airway patent. posterior pharynx with mild erythema   Neck: No neck stiffness. FROM.   CV: RRR, nl s1/s2.  Resp: CTAB, normal rate and effort  GI: Abdomen soft, NT, ND. No rebound, no guarding  Neuro: A&O x 3, moving all 4 extremities  MSK: No spine or joint tenderness to palpation  Skin: No rashes. intact and perfused.

## 2024-01-03 NOTE — ED ADULT TRIAGE NOTE - CHIEF COMPLAINT QUOTE
no pt c/o n/v, body aches and fevers, pt was diagnosed w/ FLU A yesterday pt was gvn po meds but states "I am feeling worse", pt unable to tolerate po.

## 2024-01-03 NOTE — H&P ADULT - NEGATIVE OPHTHALMOLOGIC SYMPTOMS
no blurred vision L/no blurred vision R/no discharge L/no discharge R/no pain L/no pain R/no irritation L/no irritation R

## 2024-01-03 NOTE — ED ADULT NURSE NOTE - CHIEF COMPLAINT QUOTE
pt c/o n/v, body aches and fevers, pt was diagnosed w/ FLU A yesterday pt was gvn po meds but states "I am feeling worse", pt unable to tolerate po.

## 2024-01-03 NOTE — ED ADULT NURSE NOTE - NSFALLUNIVINTERV_ED_ALL_ED
Bed/Stretcher in lowest position, wheels locked, appropriate side rails in place/Call bell, personal items and telephone in reach/Instruct patient to call for assistance before getting out of bed/chair/stretcher/Non-slip footwear applied when patient is off stretcher/[place] to call system/Physically safe environment - no spills, clutter or unnecessary equipment/Purposeful proactive rounding/Room/bathroom lighting operational, light cord in reach Bed/Stretcher in lowest position, wheels locked, appropriate side rails in place/Call bell, personal items and telephone in reach/Instruct patient to call for assistance before getting out of bed/chair/stretcher/Non-slip footwear applied when patient is off stretcher/[place] to call system/Physically safe environment - no spills, clutter or unnecessary equipment/Purposeful proactive rounding/Room/bathroom lighting operational, light cord in reach

## 2024-01-03 NOTE — H&P ADULT - ASSESSMENT
37 y/o female with PMHx of type 1 DM (on insulin) and HTN who presents to the ED c/o nausea and vomiting that started this morning. Pt is admitted to the ICU, found to have:     Diabetic Ketoacidosis   Influenza    Plan:   - On insulin gtt 6 units/hr, Q1 glucose check   - Tamiflu 75 mg ordered  - Received 3L of LR, additional 1L bolus ordered  - On maintenance fluid 1L of LR at 150mL/hr  - Pt saturating well on RM, supplemental O2 via NC PRN, titrate to keep O2> 92%  - DVT prophylaxis: Lovenox 40 mg Daily  - GI prophylaxis: IV Pepcid 20 mg Daily  - Repeat BMP, Mg, Phos at 1am and 4 hours thereafter   - Pending UA/Ucx  - Swab for Flu, RSV and COVID ordered  - Pt was febrile, blood culture ordered   39 y/o female with PMHx of type 1 DM (on insulin) and HTN who presents to the ED c/o nausea and vomiting that started this morning. Pt is admitted to the ICU, found to have:     Diabetic Ketoacidosis   Influenza    Plan:   - On insulin gtt 6 units/hr, monitor   - Tamiflu 75 mg ordered  - Received 3L of LR, additional 1L bolus ordered  - On maintenance fluid 1L of LR at 150mL/hr  - Pt saturating well on RM, supplemental O2 via NC PRN, titrate to keep O2> 92%  - DVT prophylaxis: Lovenox 40 mg Daily  - GI prophylaxis: IV Pepcid 20 mg Daily  - Repeat BMP, Mg, Phos at 1am and 4 hours thereafter   - Pending UA/Ucx  - Swab for Flu, RSV and COVID ordered  - Pt was febrile, blood culture ordered   39 y/o female with PMHx of type 1 DM (on insulin) and HTN who presents to the ED c/o nausea and vomiting that started this morning. Pt is admitted to the ICU, found to have:     Diabetic Ketoacidosis   Influenza    Plan:   - Admit to ICU  - On insulin gtt 6 units/hr, check blood glucose every hour   - Tamiflu 75 mg ordered  - Received 3L of LR, additional 1L bolus ordered  - On maintenance fluid 1L of LR at 150mL/hr  - Pt saturating well on RM, supplemental O2 via NC PRN, titrate to keep O2> 92%  - DVT prophylaxis: Lovenox 40 mg Daily  - On clear liquid diet, as tolerated  - GI prophylaxis: IV Pepcid 20 mg Daily  - Zofran PRN for nausea  - Repeat BMP, Mg, Phos Q4-6h   - Pending UA/Ucx  - Swab for Flu, RSV and COVID ordered  - Pt was febrile, blood culture ordered.   - Monitor off abx, no suspicion of bacterial infectious process. Monitor WBC and fevers. CXR reviewed: no evidence of superimposed bacterial PNA       37 y/o female with PMHx of type 1 DM (on insulin) and HTN who presents to the ED c/o nausea and vomiting that started this morning. Pt is admitted to the ICU, found to have:     Diabetic Ketoacidosis   Influenza    Plan:   - Admit to ICU  - On insulin gtt 6 units/hr, check blood glucose every hour   - Tamiflu 75 mg ordered  - Received 3L of LR, additional 1L bolus ordered  - On maintenance fluid 1L of LR at 150mL/hr  - Pt saturating well on RM, supplemental O2 via NC PRN, titrate to keep O2> 92%  - DVT prophylaxis: Lovenox 40 mg Daily  - On clear liquid diet, as tolerated  - GI prophylaxis: IV Pepcid 20 mg Daily  - Zofran PRN for nausea  - Repeat BMP, Mg, Phos Q4-6h   - Pending UA/Ucx  - Swab for Flu, RSV and COVID ordered  - Pt was febrile, blood culture ordered.   - Monitor off abx, no suspicion of bacterial infectious process. Monitor WBC and fevers. CXR reviewed: no evidence of superimposed bacterial PNA       39 y/o female with PMHx of type 1 DM (on insulin) and HTN who presents to the ED c/o nausea and vomiting that started this morning. Pt is admitted to the ICU, found to have:     Type 1 DM with Diabetic Ketoacidosis   Influenza  Dehydration   Hypovolemia    Plan:   - Admit to ICU  - On insulin gtt 6 units/hr, check blood glucose every hour   - Tamiflu 75 mg ordered  - Received 3L of LR, additional 1L bolus ordered  - On maintenance fluid 1L of LR at 150mL/hr  - Pt saturating well on RM, supplemental O2 via NC PRN, titrate to keep O2> 92%  - DVT prophylaxis: Lovenox 40 mg Daily  - On clear liquid diet, as tolerated  - GI prophylaxis: IV Pepcid 20 mg Daily  - Zofran PRN for nausea  - Repeat BMP, Mg, Phos Q4-6h   - Pending UA/Ucx  - Swab for Flu, RSV and COVID ordered  - Pt was febrile, blood culture ordered.   - Monitor off abx, no suspicion of bacterial infectious process. Monitor WBC and fevers. CXR reviewed: no evidence of superimposed bacterial PNA       37 y/o female with PMHx of type 1 DM (on insulin) and HTN who presents to the ED c/o nausea and vomiting that started this morning. Pt is admitted to the ICU, found to have:     Type 1 DM with Diabetic Ketoacidosis   Influenza  Dehydration   Hypovolemia    Plan:   - Admit to ICU  - On insulin gtt 6 units/hr, check blood glucose every hour   - Tamiflu 75 mg ordered  - Received 3L of LR, additional 1L bolus ordered  - On maintenance fluid 1L of LR at 150mL/hr  - Pt saturating well on RM, supplemental O2 via NC PRN, titrate to keep O2> 92%  - DVT prophylaxis: Lovenox 40 mg Daily  - On clear liquid diet, as tolerated  - GI prophylaxis: IV Pepcid 20 mg Daily  - Zofran PRN for nausea  - Repeat BMP, Mg, Phos Q4-6h   - Pending UA/Ucx  - Swab for Flu, RSV and COVID ordered  - Pt was febrile, blood culture ordered.   - Monitor off abx, no suspicion of bacterial infectious process. Monitor WBC and fevers. CXR reviewed: no evidence of superimposed bacterial PNA       37 y/o female with PMHx of type 1 DM (on insulin) and HTN who presents to the ED c/o nausea and vomiting that started this morning. Pt is admitted to the ICU, found to have:     Type 1 DM with Diabetic Ketoacidosis, likely due to underlying viral process and component of medication non-compliance  Influenza  Dehydration   Hypovolemia    Plan:   - Admit to ICU  - On insulin gtt 6 units/hr, check blood glucose every hour   - Tamiflu 75 mg ordered  - Received 3L of LR, additional 1L bolus ordered  - On maintenance fluid 1L of LR at 150mL/hr  - Pt saturating well on RM, supplemental O2 via NC PRN, titrate to keep O2> 92%  - DVT prophylaxis: Lovenox 40 mg Daily  - On clear liquid diet, as tolerated  - GI prophylaxis: IV Pepcid 20 mg Daily  - Anti-emetic PRN  - Repeat BMP, Mg, Phos Q4-6h   - Pending UA/Ucx    - Swab for Flu, RSV and COVID ordered  - Pt was febrile, blood culture ordered.   - Monitor off abx, no suspicion of bacterial infectious process. Monitor WBC and fevers. CXR reviewed: no evidence of superimposed bacterial PNA       39 y/o female with PMHx of type 1 DM (on insulin) and HTN who presents to the ED c/o nausea and vomiting that started this morning. Pt is admitted to the ICU, found to have:     Type 1 DM with Diabetic Ketoacidosis, likely due to underlying viral process and component of medication non-compliance  Influenza  Dehydration   Hypovolemia    Plan:   - Admit to ICU  - On insulin gtt 6 units/hr, check blood glucose every hour   - Tamiflu 75 mg ordered  - Received 3L of LR, additional 1L bolus ordered  - On maintenance fluid 1L of LR at 150mL/hr  - Pt saturating well on RM, supplemental O2 via NC PRN, titrate to keep O2> 92%  - DVT prophylaxis: Lovenox 40 mg Daily  - On clear liquid diet, as tolerated  - GI prophylaxis: IV Pepcid 20 mg Daily  - Anti-emetic PRN  - Repeat BMP, Mg, Phos Q4-6h   - Pending UA/Ucx    - Swab for Flu, RSV and COVID ordered  - Pt was febrile, blood culture ordered.   - Monitor off abx, no suspicion of bacterial infectious process. Monitor WBC and fevers. CXR reviewed: no evidence of superimposed bacterial PNA

## 2024-01-04 ENCOUNTER — TRANSCRIPTION ENCOUNTER (OUTPATIENT)
Age: 39
End: 2024-01-04

## 2024-01-04 VITALS
RESPIRATION RATE: 18 BRPM | TEMPERATURE: 99 F | HEART RATE: 90 BPM | SYSTOLIC BLOOD PRESSURE: 117 MMHG | DIASTOLIC BLOOD PRESSURE: 77 MMHG | OXYGEN SATURATION: 100 %

## 2024-01-04 DIAGNOSIS — Z90.89 ACQUIRED ABSENCE OF OTHER ORGANS: Chronic | ICD-10-CM

## 2024-01-04 PROBLEM — E11.9 TYPE 2 DIABETES MELLITUS WITHOUT COMPLICATIONS: Chronic | Status: ACTIVE | Noted: 2023-01-16

## 2024-01-04 LAB
ANION GAP SERPL CALC-SCNC: 11 MMOL/L — SIGNIFICANT CHANGE UP (ref 5–17)
ANION GAP SERPL CALC-SCNC: 11 MMOL/L — SIGNIFICANT CHANGE UP (ref 5–17)
ANION GAP SERPL CALC-SCNC: 16 MMOL/L — SIGNIFICANT CHANGE UP (ref 5–17)
ANION GAP SERPL CALC-SCNC: 16 MMOL/L — SIGNIFICANT CHANGE UP (ref 5–17)
BUN SERPL-MCNC: 11.5 MG/DL — SIGNIFICANT CHANGE UP (ref 8–20)
BUN SERPL-MCNC: 11.5 MG/DL — SIGNIFICANT CHANGE UP (ref 8–20)
BUN SERPL-MCNC: 8.7 MG/DL — SIGNIFICANT CHANGE UP (ref 8–20)
BUN SERPL-MCNC: 8.7 MG/DL — SIGNIFICANT CHANGE UP (ref 8–20)
CALCIUM SERPL-MCNC: 7.9 MG/DL — LOW (ref 8.4–10.5)
CHLORIDE SERPL-SCNC: 100 MMOL/L — SIGNIFICANT CHANGE UP (ref 96–108)
CHLORIDE SERPL-SCNC: 100 MMOL/L — SIGNIFICANT CHANGE UP (ref 96–108)
CHLORIDE SERPL-SCNC: 105 MMOL/L — SIGNIFICANT CHANGE UP (ref 96–108)
CHLORIDE SERPL-SCNC: 105 MMOL/L — SIGNIFICANT CHANGE UP (ref 96–108)
CO2 SERPL-SCNC: 15 MMOL/L — LOW (ref 22–29)
CO2 SERPL-SCNC: 15 MMOL/L — LOW (ref 22–29)
CO2 SERPL-SCNC: 19 MMOL/L — LOW (ref 22–29)
CO2 SERPL-SCNC: 19 MMOL/L — LOW (ref 22–29)
CREAT SERPL-MCNC: 0.53 MG/DL — SIGNIFICANT CHANGE UP (ref 0.5–1.3)
CREAT SERPL-MCNC: 0.53 MG/DL — SIGNIFICANT CHANGE UP (ref 0.5–1.3)
CREAT SERPL-MCNC: 0.67 MG/DL — SIGNIFICANT CHANGE UP (ref 0.5–1.3)
CREAT SERPL-MCNC: 0.67 MG/DL — SIGNIFICANT CHANGE UP (ref 0.5–1.3)
EGFR: 113 ML/MIN/1.73M2 — SIGNIFICANT CHANGE UP
EGFR: 113 ML/MIN/1.73M2 — SIGNIFICANT CHANGE UP
EGFR: 120 ML/MIN/1.73M2 — SIGNIFICANT CHANGE UP
EGFR: 120 ML/MIN/1.73M2 — SIGNIFICANT CHANGE UP
FLUAV + FLUBV RNA SPEC NAA+PROBE: DETECTED
FLUAV + FLUBV RNA SPEC NAA+PROBE: DETECTED
FLUAV AG NPH QL: DETECTED
FLUAV AG NPH QL: DETECTED
FLUBV AG NPH QL: SIGNIFICANT CHANGE UP
FLUBV AG NPH QL: SIGNIFICANT CHANGE UP
GLUCOSE BLDC GLUCOMTR-MCNC: 105 MG/DL — HIGH (ref 70–99)
GLUCOSE BLDC GLUCOMTR-MCNC: 105 MG/DL — HIGH (ref 70–99)
GLUCOSE BLDC GLUCOMTR-MCNC: 107 MG/DL — HIGH (ref 70–99)
GLUCOSE BLDC GLUCOMTR-MCNC: 117 MG/DL — HIGH (ref 70–99)
GLUCOSE BLDC GLUCOMTR-MCNC: 117 MG/DL — HIGH (ref 70–99)
GLUCOSE BLDC GLUCOMTR-MCNC: 131 MG/DL — HIGH (ref 70–99)
GLUCOSE BLDC GLUCOMTR-MCNC: 131 MG/DL — HIGH (ref 70–99)
GLUCOSE BLDC GLUCOMTR-MCNC: 162 MG/DL — HIGH (ref 70–99)
GLUCOSE BLDC GLUCOMTR-MCNC: 162 MG/DL — HIGH (ref 70–99)
GLUCOSE BLDC GLUCOMTR-MCNC: 177 MG/DL — HIGH (ref 70–99)
GLUCOSE BLDC GLUCOMTR-MCNC: 177 MG/DL — HIGH (ref 70–99)
GLUCOSE BLDC GLUCOMTR-MCNC: 248 MG/DL — HIGH (ref 70–99)
GLUCOSE BLDC GLUCOMTR-MCNC: 248 MG/DL — HIGH (ref 70–99)
GLUCOSE SERPL-MCNC: 105 MG/DL — HIGH (ref 70–99)
GLUCOSE SERPL-MCNC: 105 MG/DL — HIGH (ref 70–99)
GLUCOSE SERPL-MCNC: 173 MG/DL — HIGH (ref 70–99)
GLUCOSE SERPL-MCNC: 173 MG/DL — HIGH (ref 70–99)
MAGNESIUM SERPL-MCNC: 1.6 MG/DL — SIGNIFICANT CHANGE UP (ref 1.6–2.6)
MAGNESIUM SERPL-MCNC: 1.6 MG/DL — SIGNIFICANT CHANGE UP (ref 1.6–2.6)
MAGNESIUM SERPL-MCNC: 2.2 MG/DL — SIGNIFICANT CHANGE UP (ref 1.6–2.6)
MAGNESIUM SERPL-MCNC: 2.2 MG/DL — SIGNIFICANT CHANGE UP (ref 1.6–2.6)
PHOSPHATE SERPL-MCNC: 2.2 MG/DL — LOW (ref 2.4–4.7)
PHOSPHATE SERPL-MCNC: 2.2 MG/DL — LOW (ref 2.4–4.7)
PHOSPHATE SERPL-MCNC: 3 MG/DL — SIGNIFICANT CHANGE UP (ref 2.4–4.7)
PHOSPHATE SERPL-MCNC: 3 MG/DL — SIGNIFICANT CHANGE UP (ref 2.4–4.7)
POTASSIUM SERPL-MCNC: 4.3 MMOL/L — SIGNIFICANT CHANGE UP (ref 3.5–5.3)
POTASSIUM SERPL-SCNC: 4.3 MMOL/L — SIGNIFICANT CHANGE UP (ref 3.5–5.3)
RSV RNA NPH QL NAA+NON-PROBE: SIGNIFICANT CHANGE UP
RSV RNA NPH QL NAA+NON-PROBE: SIGNIFICANT CHANGE UP
SARS-COV-2 RNA SPEC QL NAA+PROBE: SIGNIFICANT CHANGE UP
SARS-COV-2 RNA SPEC QL NAA+PROBE: SIGNIFICANT CHANGE UP
SODIUM SERPL-SCNC: 131 MMOL/L — LOW (ref 135–145)
SODIUM SERPL-SCNC: 131 MMOL/L — LOW (ref 135–145)
SODIUM SERPL-SCNC: 135 MMOL/L — SIGNIFICANT CHANGE UP (ref 135–145)
SODIUM SERPL-SCNC: 135 MMOL/L — SIGNIFICANT CHANGE UP (ref 135–145)

## 2024-01-04 PROCEDURE — 84132 ASSAY OF SERUM POTASSIUM: CPT

## 2024-01-04 PROCEDURE — 85025 COMPLETE CBC W/AUTO DIFF WBC: CPT

## 2024-01-04 PROCEDURE — 87637 SARSCOV2&INF A&B&RSV AMP PRB: CPT

## 2024-01-04 PROCEDURE — 82803 BLOOD GASES ANY COMBINATION: CPT

## 2024-01-04 PROCEDURE — 93005 ELECTROCARDIOGRAM TRACING: CPT

## 2024-01-04 PROCEDURE — 96374 THER/PROPH/DIAG INJ IV PUSH: CPT

## 2024-01-04 PROCEDURE — 99285 EMERGENCY DEPT VISIT HI MDM: CPT | Mod: 25

## 2024-01-04 PROCEDURE — 84100 ASSAY OF PHOSPHORUS: CPT

## 2024-01-04 PROCEDURE — 82435 ASSAY OF BLOOD CHLORIDE: CPT

## 2024-01-04 PROCEDURE — 82009 KETONE BODYS QUAL: CPT

## 2024-01-04 PROCEDURE — 85018 HEMOGLOBIN: CPT

## 2024-01-04 PROCEDURE — 85014 HEMATOCRIT: CPT

## 2024-01-04 PROCEDURE — 83735 ASSAY OF MAGNESIUM: CPT

## 2024-01-04 PROCEDURE — 80048 BASIC METABOLIC PNL TOTAL CA: CPT

## 2024-01-04 PROCEDURE — 36415 COLL VENOUS BLD VENIPUNCTURE: CPT

## 2024-01-04 PROCEDURE — 99239 HOSP IP/OBS DSCHRG MGMT >30: CPT

## 2024-01-04 PROCEDURE — 84702 CHORIONIC GONADOTROPIN TEST: CPT

## 2024-01-04 PROCEDURE — 71046 X-RAY EXAM CHEST 2 VIEWS: CPT

## 2024-01-04 PROCEDURE — 82330 ASSAY OF CALCIUM: CPT

## 2024-01-04 PROCEDURE — 82962 GLUCOSE BLOOD TEST: CPT

## 2024-01-04 PROCEDURE — 82947 ASSAY GLUCOSE BLOOD QUANT: CPT

## 2024-01-04 PROCEDURE — 80053 COMPREHEN METABOLIC PANEL: CPT

## 2024-01-04 PROCEDURE — 83605 ASSAY OF LACTIC ACID: CPT

## 2024-01-04 PROCEDURE — 84295 ASSAY OF SERUM SODIUM: CPT

## 2024-01-04 PROCEDURE — 87040 BLOOD CULTURE FOR BACTERIA: CPT

## 2024-01-04 RX ORDER — INSULIN LISPRO 100/ML
3 VIAL (ML) SUBCUTANEOUS
Refills: 0 | Status: DISCONTINUED | OUTPATIENT
Start: 2024-01-04 | End: 2024-01-04

## 2024-01-04 RX ORDER — INSULIN LISPRO 100/ML
8 VIAL (ML) SUBCUTANEOUS
Qty: 0 | Refills: 0 | DISCHARGE

## 2024-01-04 RX ORDER — GLUCAGON INJECTION, SOLUTION 0.5 MG/.1ML
1 INJECTION, SOLUTION SUBCUTANEOUS ONCE
Refills: 0 | Status: DISCONTINUED | OUTPATIENT
Start: 2024-01-04 | End: 2024-01-04

## 2024-01-04 RX ORDER — INSULIN LISPRO 100/ML
VIAL (ML) SUBCUTANEOUS
Refills: 0 | Status: DISCONTINUED | OUTPATIENT
Start: 2024-01-04 | End: 2024-01-04

## 2024-01-04 RX ORDER — INSULIN LISPRO 100/ML
VIAL (ML) SUBCUTANEOUS AT BEDTIME
Refills: 0 | Status: DISCONTINUED | OUTPATIENT
Start: 2024-01-04 | End: 2024-01-04

## 2024-01-04 RX ORDER — SODIUM CHLORIDE 9 MG/ML
1000 INJECTION, SOLUTION INTRAVENOUS
Refills: 0 | Status: DISCONTINUED | OUTPATIENT
Start: 2024-01-04 | End: 2024-01-04

## 2024-01-04 RX ORDER — MAGNESIUM SULFATE 500 MG/ML
2 VIAL (ML) INJECTION ONCE
Refills: 0 | Status: COMPLETED | OUTPATIENT
Start: 2024-01-04 | End: 2024-01-04

## 2024-01-04 RX ORDER — INSULIN GLARGINE 100 [IU]/ML
14 INJECTION, SOLUTION SUBCUTANEOUS EVERY MORNING
Refills: 0 | Status: DISCONTINUED | OUTPATIENT
Start: 2024-01-04 | End: 2024-01-04

## 2024-01-04 RX ORDER — ONDANSETRON 8 MG/1
1 TABLET, FILM COATED ORAL
Qty: 30 | Refills: 0
Start: 2024-01-04

## 2024-01-04 RX ORDER — DEXTROSE 50 % IN WATER 50 %
25 SYRINGE (ML) INTRAVENOUS ONCE
Refills: 0 | Status: DISCONTINUED | OUTPATIENT
Start: 2024-01-04 | End: 2024-01-04

## 2024-01-04 RX ORDER — DEXTROSE 50 % IN WATER 50 %
12.5 SYRINGE (ML) INTRAVENOUS ONCE
Refills: 0 | Status: DISCONTINUED | OUTPATIENT
Start: 2024-01-04 | End: 2024-01-04

## 2024-01-04 RX ORDER — INSULIN GLARGINE 100 [IU]/ML
14 INJECTION, SOLUTION SUBCUTANEOUS
Qty: 0 | Refills: 0 | DISCHARGE
Start: 2024-01-04

## 2024-01-04 RX ORDER — INSULIN LISPRO 100/ML
0 VIAL (ML) SUBCUTANEOUS
Qty: 0 | Refills: 0 | DISCHARGE

## 2024-01-04 RX ORDER — LOSARTAN POTASSIUM 100 MG/1
0 TABLET, FILM COATED ORAL
Qty: 0 | Refills: 0 | DISCHARGE

## 2024-01-04 RX ORDER — DEXTROSE 50 % IN WATER 50 %
15 SYRINGE (ML) INTRAVENOUS ONCE
Refills: 0 | Status: DISCONTINUED | OUTPATIENT
Start: 2024-01-04 | End: 2024-01-04

## 2024-01-04 RX ORDER — ONDANSETRON 8 MG/1
1 TABLET, FILM COATED ORAL
Qty: 1 | Refills: 0
Start: 2024-01-04 | End: 2024-01-08

## 2024-01-04 RX ORDER — SODIUM CHLORIDE 9 MG/ML
1000 INJECTION, SOLUTION INTRAVENOUS ONCE
Refills: 0 | Status: COMPLETED | OUTPATIENT
Start: 2024-01-04 | End: 2024-01-04

## 2024-01-04 RX ADMIN — SODIUM CHLORIDE 150 MILLILITER(S): 9 INJECTION, SOLUTION INTRAVENOUS at 07:41

## 2024-01-04 RX ADMIN — Medication 1000 MILLIGRAM(S): at 07:30

## 2024-01-04 RX ADMIN — INSULIN GLARGINE 14 UNIT(S): 100 INJECTION, SOLUTION SUBCUTANEOUS at 03:36

## 2024-01-04 RX ADMIN — Medication 75 MILLIGRAM(S): at 08:48

## 2024-01-04 RX ADMIN — SODIUM CHLORIDE 150 MILLILITER(S): 9 INJECTION, SOLUTION INTRAVENOUS at 02:03

## 2024-01-04 RX ADMIN — SODIUM CHLORIDE 1000 MILLILITER(S): 9 INJECTION, SOLUTION INTRAVENOUS at 03:41

## 2024-01-04 RX ADMIN — Medication 50 GRAM(S): at 03:30

## 2024-01-04 RX ADMIN — Medication 75 MILLIGRAM(S): at 00:11

## 2024-01-04 RX ADMIN — SODIUM CHLORIDE 1000 MILLILITER(S): 9 INJECTION, SOLUTION INTRAVENOUS at 00:11

## 2024-01-04 RX ADMIN — INSULIN HUMAN 3 UNIT(S)/HR: 100 INJECTION, SOLUTION SUBCUTANEOUS at 07:40

## 2024-01-04 RX ADMIN — INSULIN HUMAN 3 UNIT(S)/HR: 100 INJECTION, SOLUTION SUBCUTANEOUS at 03:36

## 2024-01-04 NOTE — DISCHARGE NOTE PROVIDER - TIME SPENT: (MINUTES SPENT ON THE DISCHARGE SERVICE)
Labor Epidural      Patient reassessed immediately prior to procedure    Patient location during procedure: OB  Start Time: 10/18/2019 1:12 AM  Stop Time: 10/18/2019 1:23 AM  Indication:at surgeon's request  Performed By  Anesthesiologist: Quan Augustin MD  Preanesthetic Checklist  Completed: patient identified, site marked, surgical consent, pre-op evaluation, timeout performed, IV checked, risks and benefits discussed and monitors and equipment checked  Prep:  Pt Position:sitting  Sterile Tech:cap, gloves and mask  Prep:povidone-iodine 7.5% surgical scrub  Monitoring:blood pressure monitoring, continuous pulse oximetry and EKG  Epidural Block Procedure:  Approach:midline  Guidance:landmark technique  Location:L4-L5  Needle Type:Tuohy  Needle Gauge:17 and 17 G  Loss of Resistance Medium: air  Loss of Resistance: 7cm  Cath Depth at skin:10 cm  Paresthesia: none  Aspiration:negative  Test Dose:negative  Post Assessment:  Dressing:occlusive dressing applied and secured with tape  Pt Tolerance:patient tolerated the procedure well with no apparent complications  Complications:no            
35

## 2024-01-04 NOTE — DISCHARGE NOTE PROVIDER - NSDCCPCAREPLAN_GEN_ALL_CORE_FT
PRINCIPAL DISCHARGE DIAGNOSIS  Diagnosis: DKA (diabetic ketoacidosis)  Assessment and Plan of Treatment:       SECONDARY DISCHARGE DIAGNOSES  Diagnosis: Influenza  Assessment and Plan of Treatment:

## 2024-01-04 NOTE — DISCHARGE NOTE NURSING/CASE MANAGEMENT/SOCIAL WORK - PATIENT PORTAL LINK FT
You can access the FollowMyHealth Patient Portal offered by [facility] by registering at the following website: [email or web address]. By joining [facility]’s FollowMyHealth portal, you will also be able to view your health information using other applications (apps) compatible with our system. You can access the FollowMyHealth Patient Portal offered by [facility] by registering at the following website: [email or web address]. By joining [facility]’s FollowMyHealth portal, you will also be able to view your health information using other applications (apps) compatible with our system.

## 2024-01-04 NOTE — DISCHARGE NOTE PROVIDER - NSDCMRMEDTOKEN_GEN_ALL_CORE_FT
HumaLOG 100 units/mL subcutaneous solution: 8 unit(s) subcutaneous 3 times a day (before meals)  insulin glargine 100 units/mL subcutaneous solution: 14 unit(s) subcutaneous once a day

## 2024-01-04 NOTE — DISCHARGE NOTE PROVIDER - HOSPITAL COURSE
39 yo female with hx of DM on Lantus /Humalog presented to the hospital 1/3 with DKA, recently diagnosed influenza A.  Patient reports missing dose of her insulin.  Was treated overnight with insulin infusion and fluids, anion gap resolved, transitioned to lantus.    Patient felt better in AM, wanted to leave AMA due to  issues (her son is at home, being watched by nephew).  Patient states she has enough insulin at home, advised of risks of leaving AMA.   used.    Patient instructed to restart home insulin regimen.

## 2024-01-04 NOTE — ED ADULT NURSE REASSESSMENT NOTE - BEFAST EYES
Office Visit    Patient Name: Violet Mirza    : 1950  MRN: [id number]    Subjective:  Violet is a 71 y.o. female who presents today for:    Follow-up (6 month follow up /)    70 yo patient of mine who presents for 6 month follow up  annual physical(21)  of chronic conditions that include paroxysmal AFib(2018 in [facility] was given diltiazem & converted spontaneously to NSR), HFrEF (EF 40-45% 21) followed by cardiology Dr Duran-- last seen 3/16/21, hypothyroidism, hypertension, obesity, osteopenia, rosacea, remote prior history of kidney stones.     Labs drawn prior to office visit 22 show normal liver and kidney function, fasting blood sugar of 111, LDL of 98-not on any cholesterol medications, TSH remaining at goal on 1+ range on current dose of thyroid medication  A1c W/ INCREASE TO 5.9  From 5.7.      Vitamin-D previously checked 21 and found to have normalized at 51 on vitamin D3 supplement-was previously low and she has borderline osteoporosis.     She is postmenopausal.  She does have a gynecologist-- Renny Deshpande, sees yearly in January and yearly mammogram pending through GYN.      She has been feeling overall well.  Following with outside orthopedists Marika Miller (PT for R rotator cuff tear-- repaired 21 by Dr Callejas) and Britta for shoulder and knee issues.  Reports good energy level and no acute complaints.   Notes no increased swelling and has lost weight despite decrease in daily lasix dose from 40--> 20 mg daily.      General lifestyle habits are as follows:  Diet is described as healthy and watches salt and carbs-- home cooked meals BUT SHE WAS BAKING MORE FOR THE HOLIDAYS, exercise is described as recently very good-- doing a home [employer] Workout programs with good variety of muscle building and cardio, sleep is described as fair-- interrupted to use the bathroom as she takes Lasix at night.   Weight is DOWN AOBUT 10 LBS IN THE LAST 6 MONTHS 
     Immunizations: TDaP 4/5/19-- repeat 10 years, yearly FLU 11/12/21, ZOSTAVAX  8/11/2011 & SHINGRIX 2/2 7/13/20, Pneumovax 23 11/3/2014, PREVNAR 13 10/18/2015, COVID-19 completed 2/8/21 w/ Pfizer Booster 10/7/21     Screening Tests: DEXA 7/19/21-Osteopenia showing improvement on calcium/vitamin-D 3--REPEAT 2 years, mammogram-- yearly per ob gyn PENDING FOR February 2022, PAP no longer indicated-- >65 w/o h/o abnormal, colonoscopy through [facility] 1/22/2015-- repeat 10 years, Hep C screening 10/14/2015     Eye/Dental: UTD with Both- yearly, EYE FITZMORRIS 4/7/21-- needs ophthalmology referral     Saw Cardiology Dr. Loco electrophysiology 10/18/2019 and there was a concern for greater numbers of PVCs noted on EKG.  They advised a Holter monitor, which she declined, but since her ejection fraction is overall stable on echo they are okay with monitoring     Echo 3/25/21:     · The left ventricle is mildly enlarged with mildly decreased systolic function. The estimated ejection fraction is 40-45% (at one point previously her EF was 10-15% in 2019)  · Mild left atrial enlargement.  · Grade II left ventricular diastolic dysfunction.  · Normal right ventricular size with normal right ventricular systolic function.  · Normal central venous pressure (3 mmHg).  · The estimated PA systolic pressure is 16 mmHg.         PAST MEDICAL HISTORY, SURGICAL/SOCIAL/FAMILY HISTORY REVIEWED AS PER CHART, WITH PERTINENT FINDINGS INCLUDED IN HISTORY SECTION OF NOTE.     Current Medications    Medication List with Changes/Refills   Current Medications    DICLOFENAC SODIUM (VOLTAREN) 1 % GEL    Apply 4 g  topically to affected area 4 times a day as needed.    FUROSEMIDE (LASIX) 20 MG TABLET    Take 1 tablet (20 mg total) by mouth once daily.    LEVOCETIRIZINE (XYZAL) 5 MG TABLET    Take 1 tablet (5 mg total) by mouth every evening.    LEVOTHYROXINE (SYNTHROID) 112 MCG TABLET    Take 1 tablet (112 mcg total) by mouth once daily.    LOSARTAN 
(COZAAR) 100 MG TABLET    TAKE 1 TABLET BY MOUTH ONCE DAILY    METOPROLOL SUCCINATE (TOPROL-XL) 200 MG 24 HR TABLET    Take 1 tablet (200 mg total) by mouth once daily.    RIVAROXABAN (XARELTO) 20 MG TAB    Take 1 tablet (20 mg total) by mouth daily with dinner or evening meal.    TRAMADOL (ULTRAM) 50 MG TABLET    Take 50 mg by mouth every 6 (six) hours as needed for Pain.   Discontinued Medications    VARICELLA-ZOSTER GE-AS01B, PF, (SHINGRIX) 50 MCG/0.5 ML INJECTION    Inject into the muscle.       Allergies   Review of patient's allergies indicates:   Allergen Reactions    Aldactone [spironolactone] Other (See Comments)     Hyperkalemia when initiated on 01/23/2018          Review of Systems (Pertinent positives)  Review of Systems   Constitutional: Negative for unexpected weight change.   Eyes: Negative for visual disturbance.   Respiratory: Negative for chest tightness and shortness of breath.    Cardiovascular: Positive for leg swelling (mild-- stable to improved). Negative for chest pain and palpitations.   Musculoskeletal: Positive for arthralgias.   Skin: Positive for rash (associated with stasis dermatitis).   Neurological: Negative for dizziness, light-headedness and headaches.       /74   Pulse 67   Wt 105.5 kg (232 lb 9.4 oz)   SpO2 96%   BMI 35.36 kg/m²     Physical Exam  Vitals reviewed.   Constitutional:       General: She is not in acute distress.     Appearance: She is well-developed. She is obese.   HENT:      Head: Normocephalic and atraumatic.   Eyes:      Conjunctiva/sclera: Conjunctivae normal.   Cardiovascular:      Rate and Rhythm: Normal rate and regular rhythm.   Pulmonary:      Effort: Pulmonary effort is normal.      Breath sounds: Normal breath sounds.   Musculoskeletal:         General: Swelling (slight swelling over distended varicose veins of R LE) present.      Right lower leg: No edema.      Left lower leg: No edema.   Skin:     General: Skin is warm and dry.      
Findings: Rash present. Rash is papular.      Comments: Some scabbed over papules that she scratched/ picked at of R LE below the knee-- has chronic stasis dermatitis skin changes with intermittent erythematous papular rash   Neurological:      Mental Status: She is alert and oriented to person, place, and time.   Psychiatric:         Mood and Affect: Mood normal.         Behavior: Behavior normal.           Assessment/Plan:  Violet Mirza is a 71 y.o. female who presents today for :        ICD-10-CM ICD-9-CM    1. Benign essential hypertension  I10 401.1 Hemoglobin A1C      Lipid Panel      TSH      Comprehensive Metabolic Panel   2. Class 2 severe obesity due to excess calories with serious comorbidity and body mass index (BMI) of 35.0 to 35.9 in adult  E66.01 278.01     Z68.35 V85.35    3. Paroxysmal atrial fibrillation  I48.0 427.31 Ambulatory referral/consult to Cardiology   4. Current use of long term anticoagulation  Z79.01 V58.61    5. HFrEF (heart failure with reduced ejection fraction)  I50.20 428.20 Ambulatory referral/consult to Cardiology   6. Diastolic dysfunction with chronic heart failure  I50.32 428.32 Ambulatory referral/consult to Cardiology   7. PVC's (premature ventricular contractions)  I49.3 427.69    8. Acquired hypothyroidism  E03.9 244.9 TSH   9. Hypertriglyceridemia  E78.1 272.1 Hemoglobin A1C      Lipid Panel      TSH      Comprehensive Metabolic Panel   10. Vitamin D deficiency  E55.9 268.9    11. Venous insufficiency of both lower extremities  I87.2 459.81    12. Personal history of kidney stones  Z87.442 V13.01    13. Rosacea  L71.9 695.3    14. Senile cataract of right eye, unspecified age-related cataract type  H25.9 366.10 Ambulatory referral/consult to Ophthalmology   15. Environmental and seasonal allergies  J30.89 477.8    16. Medication management  Z79.899 V58.69 Hemoglobin A1C      Lipid Panel      TSH      Comprehensive Metabolic Panel      Vitamin B12       HEALTH 
MAINTENANCE REVIEWED AND IS UP-TO-DATE INCLUDING COVID-19 BOOSTER-- OPHTHALMOLOGY REFERRAL PLACED     Sees cardiology secondary to history of heart failure with reduced ejection fraction & diastolic dysfunction w/  paroxysmal AFib:  doing well, improvement in most recent ejection fraction to 40-45 % on current medications.  CARDIAC MEDS INCLUDE: toprol xl 200 mg daily, and cozaar 100 mg daily, along with Xarelto for stroke prevention in Paroxysmal A Fib. Euvolemic in dose of 20 mg Lasix daily. REFERRAL FOR YEARLY CARDIOLOGY VISIT.     Hypothyroidism, hypertension controlled on current medications, continue to monitor A1c as it is in borderline prediabetic range at 5.9-- suspect will improve as she is back on track with diet following the holidays and exercising regularly.     Allergies stable on daily Xyzal, thought she cannot go with out it.     Venous insufficiency improved following vein procedure and with reduced swelling 2/2 lasix and weight loss. Advised to apply vaseline to areas of dry skin/ skin sores.     There are no Patient Instructions on file for this visit.      Follow up in about 6 months (around 7/31/2022) for to follow up on lab results, return as needed for new concerns.    
No
No

## 2024-01-04 NOTE — ED ADULT NURSE REASSESSMENT NOTE - NS ED NURSE REASSESS COMMENT FT1
ICU at bedside
assumed care at 2300 from KIM Bales. pt in NSR on insulin gtt per order, respirations even and unlabored. pt shows no s/s of acute distress at this time. safety precautions maintained.
Assumed care of pt at this time. Pt A&O x 4 on insulin drip hx of DM reports improvement in symptoms. Denies complaints at this time and resting comfortably. Pt alert, awake and following directions. PERRL. Strength WNL. Sensory intact. No N/V. Remains on telemonitor with . Bed locked and in lowest position. Frequent checks made. Able to make needs known.
Pt A&O x 4 ambulating with steady gait and no assistance requests to leave hospital due to childcare issues. RN called Dr. YARA Koch twice on teams and messaged to advise; response pending. Pt remains on telemonitor with . IV fluids running. Bed locked and in lowest position. Nonslip footwear. Frequent checks made.

## 2024-01-09 LAB
CULTURE RESULTS: SIGNIFICANT CHANGE UP
SPECIMEN SOURCE: SIGNIFICANT CHANGE UP

## 2025-02-03 ENCOUNTER — INPATIENT (INPATIENT)
Facility: HOSPITAL | Age: 40
LOS: 1 days | Discharge: HOME | DRG: 639 | End: 2025-02-05
Attending: STUDENT IN AN ORGANIZED HEALTH CARE EDUCATION/TRAINING PROGRAM | Admitting: STUDENT IN AN ORGANIZED HEALTH CARE EDUCATION/TRAINING PROGRAM
Payer: COMMERCIAL

## 2025-02-03 VITALS
HEIGHT: 66 IN | DIASTOLIC BLOOD PRESSURE: 80 MMHG | HEART RATE: 87 BPM | RESPIRATION RATE: 16 BRPM | SYSTOLIC BLOOD PRESSURE: 116 MMHG | TEMPERATURE: 98 F | WEIGHT: 141.54 LBS | OXYGEN SATURATION: 99 %

## 2025-02-03 DIAGNOSIS — Z90.89 ACQUIRED ABSENCE OF OTHER ORGANS: Chronic | ICD-10-CM

## 2025-02-03 DIAGNOSIS — E11.10 TYPE 2 DIABETES MELLITUS WITH KETOACIDOSIS WITHOUT COMA: ICD-10-CM

## 2025-02-03 LAB
ACETONE SERPL-MCNC: ABNORMAL
ALBUMIN SERPL ELPH-MCNC: 2.6 G/DL — LOW (ref 3.3–5.2)
ALBUMIN SERPL ELPH-MCNC: 3.8 G/DL — SIGNIFICANT CHANGE UP (ref 3.3–5.2)
ALBUMIN SERPL ELPH-MCNC: 4.1 G/DL — SIGNIFICANT CHANGE UP (ref 3.3–5.2)
ALP SERPL-CCNC: 106 U/L — SIGNIFICANT CHANGE UP (ref 40–120)
ALP SERPL-CCNC: 62 U/L — SIGNIFICANT CHANGE UP (ref 40–120)
ALP SERPL-CCNC: 95 U/L — SIGNIFICANT CHANGE UP (ref 40–120)
ALT FLD-CCNC: 12 U/L — SIGNIFICANT CHANGE UP
ALT FLD-CCNC: 20 U/L — SIGNIFICANT CHANGE UP
ALT FLD-CCNC: 20 U/L — SIGNIFICANT CHANGE UP
ANION GAP SERPL CALC-SCNC: 20 MMOL/L — HIGH (ref 5–17)
ANION GAP SERPL CALC-SCNC: 21 MMOL/L — HIGH (ref 5–17)
ANION GAP SERPL CALC-SCNC: 27 MMOL/L — HIGH (ref 5–17)
ANION GAP SERPL CALC-SCNC: 30 MMOL/L — HIGH (ref 5–17)
APPEARANCE UR: CLEAR — SIGNIFICANT CHANGE UP
APTT BLD: 27.1 SEC — SIGNIFICANT CHANGE UP (ref 24.5–35.6)
AST SERPL-CCNC: 21 U/L — SIGNIFICANT CHANGE UP
AST SERPL-CCNC: 31 U/L — SIGNIFICANT CHANGE UP
AST SERPL-CCNC: 32 U/L — HIGH
BACTERIA # UR AUTO: NEGATIVE /HPF — SIGNIFICANT CHANGE UP
BASOPHILS # BLD AUTO: 0.03 K/UL — SIGNIFICANT CHANGE UP (ref 0–0.2)
BASOPHILS NFR BLD AUTO: 0.3 % — SIGNIFICANT CHANGE UP (ref 0–2)
BILIRUB SERPL-MCNC: 0.5 MG/DL — SIGNIFICANT CHANGE UP (ref 0.4–2)
BILIRUB SERPL-MCNC: <0.2 MG/DL — LOW (ref 0.4–2)
BILIRUB SERPL-MCNC: <0.2 MG/DL — LOW (ref 0.4–2)
BILIRUB UR-MCNC: NEGATIVE — SIGNIFICANT CHANGE UP
BUN SERPL-MCNC: 12.4 MG/DL — SIGNIFICANT CHANGE UP (ref 8–20)
BUN SERPL-MCNC: 16.1 MG/DL — SIGNIFICANT CHANGE UP (ref 8–20)
BUN SERPL-MCNC: 18.3 MG/DL — SIGNIFICANT CHANGE UP (ref 8–20)
BUN SERPL-MCNC: 20.9 MG/DL — HIGH (ref 8–20)
CALCIUM SERPL-MCNC: 6.4 MG/DL — CRITICAL LOW (ref 8.4–10.5)
CALCIUM SERPL-MCNC: 7.8 MG/DL — LOW (ref 8.4–10.5)
CALCIUM SERPL-MCNC: 8.2 MG/DL — LOW (ref 8.4–10.5)
CALCIUM SERPL-MCNC: 9.1 MG/DL — SIGNIFICANT CHANGE UP (ref 8.4–10.5)
CAST: 0 /LPF — SIGNIFICANT CHANGE UP (ref 0–4)
CHLORIDE SERPL-SCNC: 100 MMOL/L — SIGNIFICANT CHANGE UP (ref 96–108)
CHLORIDE SERPL-SCNC: 102 MMOL/L — SIGNIFICANT CHANGE UP (ref 96–108)
CHLORIDE SERPL-SCNC: 89 MMOL/L — LOW (ref 96–108)
CHLORIDE SERPL-SCNC: 95 MMOL/L — LOW (ref 96–108)
CO2 SERPL-SCNC: 12 MMOL/L — LOW (ref 22–29)
CO2 SERPL-SCNC: 12 MMOL/L — LOW (ref 22–29)
CO2 SERPL-SCNC: 8 MMOL/L — CRITICAL LOW (ref 22–29)
CO2 SERPL-SCNC: 9 MMOL/L — CRITICAL LOW (ref 22–29)
COLOR SPEC: YELLOW — SIGNIFICANT CHANGE UP
CREAT SERPL-MCNC: 0.57 MG/DL — SIGNIFICANT CHANGE UP (ref 0.5–1.3)
CREAT SERPL-MCNC: 0.66 MG/DL — SIGNIFICANT CHANGE UP (ref 0.5–1.3)
CREAT SERPL-MCNC: 0.79 MG/DL — SIGNIFICANT CHANGE UP (ref 0.5–1.3)
CREAT SERPL-MCNC: 0.85 MG/DL — SIGNIFICANT CHANGE UP (ref 0.5–1.3)
DIFF PNL FLD: ABNORMAL
EGFR: 113 ML/MIN/1.73M2 — SIGNIFICANT CHANGE UP
EGFR: 117 ML/MIN/1.73M2 — SIGNIFICANT CHANGE UP
EGFR: 88 ML/MIN/1.73M2 — SIGNIFICANT CHANGE UP
EGFR: 96 ML/MIN/1.73M2 — SIGNIFICANT CHANGE UP
EOSINOPHIL # BLD AUTO: 0 K/UL — SIGNIFICANT CHANGE UP (ref 0–0.5)
EOSINOPHIL NFR BLD AUTO: 0 % — SIGNIFICANT CHANGE UP (ref 0–6)
FLUAV AG NPH QL: SIGNIFICANT CHANGE UP
FLUBV AG NPH QL: SIGNIFICANT CHANGE UP
GAS PNL BLDV: SIGNIFICANT CHANGE UP
GLUCOSE BLDC GLUCOMTR-MCNC: 169 MG/DL — HIGH (ref 70–99)
GLUCOSE BLDC GLUCOMTR-MCNC: 170 MG/DL — HIGH (ref 70–99)
GLUCOSE BLDC GLUCOMTR-MCNC: 174 MG/DL — HIGH (ref 70–99)
GLUCOSE BLDC GLUCOMTR-MCNC: 215 MG/DL — HIGH (ref 70–99)
GLUCOSE BLDC GLUCOMTR-MCNC: 223 MG/DL — HIGH (ref 70–99)
GLUCOSE BLDC GLUCOMTR-MCNC: 306 MG/DL — HIGH (ref 70–99)
GLUCOSE BLDC GLUCOMTR-MCNC: 374 MG/DL — HIGH (ref 70–99)
GLUCOSE SERPL-MCNC: 216 MG/DL — HIGH (ref 70–99)
GLUCOSE SERPL-MCNC: 468 MG/DL — CRITICAL HIGH (ref 70–99)
GLUCOSE SERPL-MCNC: 617 MG/DL — CRITICAL HIGH (ref 70–99)
GLUCOSE SERPL-MCNC: 756 MG/DL — CRITICAL HIGH (ref 70–99)
GLUCOSE UR QL: >=1000 MG/DL
HCG SERPL-ACNC: <4 MIU/ML — SIGNIFICANT CHANGE UP
HCT VFR BLD CALC: 38.6 % — SIGNIFICANT CHANGE UP (ref 34.5–45)
HGB BLD-MCNC: 13.1 G/DL — SIGNIFICANT CHANGE UP (ref 11.5–15.5)
IMM GRANULOCYTES NFR BLD AUTO: 0.3 % — SIGNIFICANT CHANGE UP (ref 0–0.9)
INR BLD: 1.02 RATIO — SIGNIFICANT CHANGE UP (ref 0.85–1.16)
KETONES UR-MCNC: >=160 MG/DL
LEUKOCYTE ESTERASE UR-ACNC: NEGATIVE — SIGNIFICANT CHANGE UP
LIDOCAIN IGE QN: 14 U/L — LOW (ref 22–51)
LYMPHOCYTES # BLD AUTO: 0.73 K/UL — LOW (ref 1–3.3)
LYMPHOCYTES # BLD AUTO: 7.1 % — LOW (ref 13–44)
MAGNESIUM SERPL-MCNC: 1.2 MG/DL — LOW (ref 1.6–2.6)
MAGNESIUM SERPL-MCNC: 1.8 MG/DL — SIGNIFICANT CHANGE UP (ref 1.6–2.6)
MCHC RBC-ENTMCNC: 30.8 PG — SIGNIFICANT CHANGE UP (ref 27–34)
MCHC RBC-ENTMCNC: 33.9 G/DL — SIGNIFICANT CHANGE UP (ref 32–36)
MCV RBC AUTO: 90.6 FL — SIGNIFICANT CHANGE UP (ref 80–100)
MONOCYTES # BLD AUTO: 0.18 K/UL — SIGNIFICANT CHANGE UP (ref 0–0.9)
MONOCYTES NFR BLD AUTO: 1.7 % — LOW (ref 2–14)
NEUTROPHILS # BLD AUTO: 9.38 K/UL — HIGH (ref 1.8–7.4)
NEUTROPHILS NFR BLD AUTO: 90.6 % — HIGH (ref 43–77)
NITRITE UR-MCNC: NEGATIVE — SIGNIFICANT CHANGE UP
PH UR: 5.5 — SIGNIFICANT CHANGE UP (ref 5–8)
PHOSPHATE SERPL-MCNC: 1.7 MG/DL — LOW (ref 2.4–4.7)
PHOSPHATE SERPL-MCNC: 2.1 MG/DL — LOW (ref 2.4–4.7)
PLATELET # BLD AUTO: 259 K/UL — SIGNIFICANT CHANGE UP (ref 150–400)
POTASSIUM SERPL-MCNC: 4.7 MMOL/L — SIGNIFICANT CHANGE UP (ref 3.5–5.3)
POTASSIUM SERPL-MCNC: 4.8 MMOL/L — SIGNIFICANT CHANGE UP (ref 3.5–5.3)
POTASSIUM SERPL-MCNC: 4.9 MMOL/L — SIGNIFICANT CHANGE UP (ref 3.5–5.3)
POTASSIUM SERPL-MCNC: 5 MMOL/L — SIGNIFICANT CHANGE UP (ref 3.5–5.3)
POTASSIUM SERPL-SCNC: 4.7 MMOL/L — SIGNIFICANT CHANGE UP (ref 3.5–5.3)
POTASSIUM SERPL-SCNC: 4.8 MMOL/L — SIGNIFICANT CHANGE UP (ref 3.5–5.3)
POTASSIUM SERPL-SCNC: 4.9 MMOL/L — SIGNIFICANT CHANGE UP (ref 3.5–5.3)
POTASSIUM SERPL-SCNC: 5 MMOL/L — SIGNIFICANT CHANGE UP (ref 3.5–5.3)
PROT SERPL-MCNC: 4.7 G/DL — LOW (ref 6.6–8.7)
PROT SERPL-MCNC: 6.9 G/DL — SIGNIFICANT CHANGE UP (ref 6.6–8.7)
PROT SERPL-MCNC: 7.4 G/DL — SIGNIFICANT CHANGE UP (ref 6.6–8.7)
PROT UR-MCNC: NEGATIVE MG/DL — SIGNIFICANT CHANGE UP
PROTHROM AB SERPL-ACNC: 11.8 SEC — SIGNIFICANT CHANGE UP (ref 9.9–13.4)
RBC # BLD: 4.26 M/UL — SIGNIFICANT CHANGE UP (ref 3.8–5.2)
RBC # FLD: 11.8 % — SIGNIFICANT CHANGE UP (ref 10.3–14.5)
RBC CASTS # UR COMP ASSIST: 0 /HPF — SIGNIFICANT CHANGE UP (ref 0–4)
RSV RNA NPH QL NAA+NON-PROBE: SIGNIFICANT CHANGE UP
SARS-COV-2 RNA SPEC QL NAA+PROBE: SIGNIFICANT CHANGE UP
SODIUM SERPL-SCNC: 124 MMOL/L — LOW (ref 135–145)
SODIUM SERPL-SCNC: 130 MMOL/L — LOW (ref 135–145)
SODIUM SERPL-SCNC: 134 MMOL/L — LOW (ref 135–145)
SODIUM SERPL-SCNC: 136 MMOL/L — SIGNIFICANT CHANGE UP (ref 135–145)
SP GR SPEC: >1.03 — HIGH (ref 1–1.03)
SQUAMOUS # UR AUTO: 0 /HPF — SIGNIFICANT CHANGE UP (ref 0–5)
UROBILINOGEN FLD QL: 0.2 MG/DL — SIGNIFICANT CHANGE UP (ref 0.2–1)
WBC # BLD: 10.35 K/UL — SIGNIFICANT CHANGE UP (ref 3.8–10.5)
WBC # FLD AUTO: 10.35 K/UL — SIGNIFICANT CHANGE UP (ref 3.8–10.5)
WBC UR QL: 0 /HPF — SIGNIFICANT CHANGE UP (ref 0–5)

## 2025-02-03 PROCEDURE — 71046 X-RAY EXAM CHEST 2 VIEWS: CPT | Mod: 26

## 2025-02-03 PROCEDURE — 99285 EMERGENCY DEPT VISIT HI MDM: CPT

## 2025-02-03 PROCEDURE — 99222 1ST HOSP IP/OBS MODERATE 55: CPT | Mod: GC

## 2025-02-03 RX ORDER — POTASSIUM CHLORIDE 750 MG/1
10 TABLET, EXTENDED RELEASE ORAL ONCE
Refills: 0 | Status: COMPLETED | OUTPATIENT
Start: 2025-02-03 | End: 2025-02-03

## 2025-02-03 RX ORDER — ANTISEPTIC SURGICAL SCRUB 0.04 MG/ML
1 SOLUTION TOPICAL
Refills: 0 | Status: DISCONTINUED | OUTPATIENT
Start: 2025-02-03 | End: 2025-02-05

## 2025-02-03 RX ORDER — SODIUM CHLORIDE 9 G/ML
1000 INJECTION, SOLUTION INTRAVENOUS
Refills: 0 | Status: DISCONTINUED | OUTPATIENT
Start: 2025-02-03 | End: 2025-02-03

## 2025-02-03 RX ORDER — SODIUM CHLORIDE 9 G/ML
1000 INJECTION, SOLUTION INTRAVENOUS
Refills: 0 | Status: DISCONTINUED | OUTPATIENT
Start: 2025-02-03 | End: 2025-02-04

## 2025-02-03 RX ORDER — ONDANSETRON 4 MG/1
4 TABLET, ORALLY DISINTEGRATING ORAL ONCE
Refills: 0 | Status: COMPLETED | OUTPATIENT
Start: 2025-02-03 | End: 2025-02-03

## 2025-02-03 RX ORDER — BACTERIOSTATIC SODIUM CHLORIDE 0.9 %
2000 VIAL (ML) INJECTION ONCE
Refills: 0 | Status: COMPLETED | OUTPATIENT
Start: 2025-02-03 | End: 2025-02-03

## 2025-02-03 RX ORDER — ENOXAPARIN SODIUM 100 MG/ML
40 INJECTION SUBCUTANEOUS EVERY 24 HOURS
Refills: 0 | Status: DISCONTINUED | OUTPATIENT
Start: 2025-02-03 | End: 2025-02-05

## 2025-02-03 RX ADMIN — SODIUM CHLORIDE 100 MILLILITER(S): 9 INJECTION, SOLUTION INTRAVENOUS at 18:13

## 2025-02-03 RX ADMIN — ONDANSETRON 4 MILLIGRAM(S): 4 TABLET, ORALLY DISINTEGRATING ORAL at 13:09

## 2025-02-03 RX ADMIN — SODIUM CHLORIDE 100 MILLILITER(S): 9 INJECTION, SOLUTION INTRAVENOUS at 17:51

## 2025-02-03 RX ADMIN — Medication 6.5 UNIT(S)/HR: at 16:37

## 2025-02-03 RX ADMIN — Medication 4 UNIT(S)/HR: at 18:31

## 2025-02-03 RX ADMIN — POTASSIUM CHLORIDE 100 MILLIEQUIVALENT(S): 750 TABLET, EXTENDED RELEASE ORAL at 17:52

## 2025-02-03 RX ADMIN — SODIUM CHLORIDE 150 MILLILITER(S): 9 INJECTION, SOLUTION INTRAVENOUS at 20:06

## 2025-02-03 RX ADMIN — Medication 2000 MILLILITER(S): at 13:08

## 2025-02-03 NOTE — ED PROVIDER NOTE - PHYSICAL EXAMINATION
Called patient and scheduled her initial Hep C appt with Galina for 6/14 @ 3:45. Gen: NAD, non-toxic, conversational  Eyes: PERRLA, EOMI   HENT: Normocephalic, atraumatic. External ears normal, no rhinorrhea, dry mucous membranes.   CV: RRR, no M/R/G  Resp: CTAB, non-labored, speaking without difficulty on room air  Abd: soft, non tender, non rigid, no guarding or rebound tenderness  Back: No CVAT bilaterally, no midline ttp  Skin: dry, wwp   Neuro: AOx3, speech is fluent and appropriate  Psych: Mood ok, affect euthymic

## 2025-02-03 NOTE — ED ADULT NURSE REASSESSMENT NOTE - NS ED NURSE REASSESS COMMENT FT1
Assumed care of pt from previous RN. A&Ox4, RR even and unlabored on RA. Skin is warm and dry. PT resting on stretcher in NAD. FS reading HI, insulin gtt started. PT on CM in NSR. Updated on plan for admission to MICU, all questions answered.

## 2025-02-03 NOTE — H&P ADULT - ASSESSMENT
39-year-old female history of type 1 diabetes on insulin, hypertension on losartan, presenting for evaluation of "feeling off ".  Noted to have a blood sugar of greater than 600 on initial fingerstick.  She has not been having any fevers or chills that she has noticed, no cough congestion sore throat or runny nose.  No diarrhea, no urinary symptoms.  States she has been in the ICU with DKA 3 times in the past, intubated once approximately a year ago.  Notes that she sometimes forgets to do her insulin as she self injects, is not sure if she has missed any doses recently, states she is busy at home with two children and doesn't recall. MICU was consult for DKA.  Pt reports taking short acting Insulin 8-8-8 after meal and long acting 10U at bedtime. Pt reports she missed dose some times because "pt is busy" but not sure how often.  Pt is currently AAOx4, HDS.       # DKA, T1DM, 2/2 possible medication non compliant       -----  NEURO/PSYCH  -A&Ox 4  -Q4 neuro checks    CARDIOVASCULAR  -Hemodynamically stable   -No vasopressor requirement now  -Continue to monitor hemodynamics and titrate vasoactive support to MAP goal > 65    PULM  -Stable respiration  -Monitor SAT, respiration    GI  -NPO now    HEME  -DVT ppx  -Monitor CBC counts    ENDO  -Insuline ggt started  -Will start D5 + 1/2 saline if BS<250  -Insulin sliding scale    RENAL  -BMP Q4. if K<5.5, replace potasium   -Keep K>4, Mag>2  -Replete electrolytes PRN    ID  -hold ABX now  -Monitor fever and WBC    CODE STATUS: Full Code.  MOBILITY:  NUTRITION:

## 2025-02-03 NOTE — ED ADULT NURSE REASSESSMENT NOTE - NS ED NURSE REASSESS COMMENT FT1
BG was checked and results relayed to MD Shore, keep insulin at current dose and recheck glucose in 1 hour.

## 2025-02-03 NOTE — ED PROVIDER NOTE - CLINICAL SUMMARY MEDICAL DECISION MAKING FREE TEXT BOX
39-year-old female presenting for evaluation of feeling off, noted to have blood sugar greater than 600 on initial fingerstick.  On examination abdomen is soft nontender nondistended.  Lungs are clear to auscultation bilaterally.  Cardiac regular rhythm and rate. Differential diagnosis including diabetic ketoacidosis, hyperglycemia, consider infections though patient without symptoms.  Will check labs, including VBG and acetone and urinalysis, obtain chest x-ray, EKG, start IV fluid bolus, follow-up studies, reassess, disposition pending clinical course and results.

## 2025-02-03 NOTE — H&P ADULT - NSHPLABSRESULTS_GEN_ALL_CORE
LABS:                        13.1   10.35 )-----------( 259      ( 03 Feb 2025 12:41 )             38.6     02-03    130[L]  |  89[L]  |  20.9[H]  ----------------------------<  617[HH]  5.0   |  12.0[L]  |  0.85    Ca    9.1      03 Feb 2025 12:41    TPro  7.4  /  Alb  4.1  /  TBili  0.5  /  DBili  x   /  AST  32[H]  /  ALT  20  /  AlkPhos  106  02-03    PT/INR - ( 03 Feb 2025 12:41 )   PT: 11.8 sec;   INR: 1.02 ratio         PTT - ( 03 Feb 2025 12:41 )  PTT:27.1 sec  Urinalysis Basic - ( 03 Feb 2025 12:41 )    Color: x / Appearance: x / SG: x / pH: x  Gluc: 617 mg/dL / Ketone: x  / Bili: x / Urobili: x   Blood: x / Protein: x / Nitrite: x   Leuk Esterase: x / RBC: x / WBC x   Sq Epi: x / Non Sq Epi: x / Bacteria: x

## 2025-02-03 NOTE — H&P ADULT - ATTENDING COMMENTS
.    39F PMH IDDM1, HTN who presented 2/3/25 with malaise, found with FSG >600. She has missed several doses of her Lantus is normally on 10U Lantus with 8U premeal insulin. Found to be in DKA, given 2L NS and started in insulin infusion. Initial anion gap 30, improved to 27. Will monitor BMP Q4-6H and replete lytes aggressively while on insulin. Symptomatic control with Zofran PRN. UA no UTI, CXR clear, no fever or leukocytosis; no antibiotics needed. DKA likely 2/2 medication non-compliance. Will admit to ICU for further management.      Lazarus Maldonado MD, [facility]  , Pulmonary & Critical Care Medicine  [facility]  Pulmonary and Sleep Medicine at [place]  [address]  [place] [address]  T: [phone]  F: [phone]

## 2025-02-03 NOTE — ED PROVIDER NOTE - CADM POA CENTRAL LINE
Subjective:   Patient ID: Topher Cr is a 55 y.o. male.    Chief Complaint: Back Pain      Back Pain  This is a recurrent problem. The current episode started more than 1 year ago. The problem occurs intermittently. The problem has been gradually worsening since onset. The pain is present in the lumbar spine. The pain is at a severity of 6/10. The pain is moderate. The pain is worse during the day. The symptoms are aggravated by bending, lying down, sitting, standing and twisting. Stiffness is present in the morning. Pertinent negatives include no abdominal pain, bladder incontinence, bowel incontinence, chest pain, dysuria, fever, headaches, leg pain, numbness, paresis, paresthesias, pelvic pain, perianal numbness, tingling, weakness or weight loss. Risk factors include lack of exercise, poor posture, obesity and sedentary lifestyle. He has tried nothing for the symptoms. The treatment provided no relief.     [age]-year-old male with low back pain.  States he  Had MRI Lumbar spine [facility].  Will get this report    No trauma.  Pain comes and goes.  No bowel or bladder symptoms.    Patient queried and denies any further complaints.    ALLERGIES AND MEDICATIONS: updated and reviewed.  Review of patient's allergies indicates:   Allergen Reactions    Cucumber fruit extract Hives and Itching       Current Outpatient Medications:     diclofenac (VOLTAREN) 75 MG EC tablet, Take 1 tablet (75 mg total) by mouth 2 (two) times daily. w food and water for 5-15 days., Disp: 30 tablet, Rfl: 0    enoxaparin (LOVENOX) 40 mg/0.4 mL Syrg, Inject 0.4 mLs (40 mg total) into the skin once daily. For DVT prophylaxis. Start use on morning after surgery. (Patient not taking: No sig reported), Disp: 28 each, Rfl: 0    ferrous gluconate 324 mg (37.5 mg iron) Tab tablet, Take 1 tablet (324 mg total) by mouth daily with breakfast. (Patient not taking: No sig reported), Disp: 30 tablet, Rfl: 2    HYDROcodone-acetaminophen (NORCO)   mg per tablet, Take 1 tablet by mouth every 4-6 hours for pain. (Patient not taking: No sig reported), Disp: 15 tablet, Rfl: 0    ketoconazole (NIZORAL) 2 % shampoo, Wash face (especially around nose and beard area), ears with medicated shampoo at least 2-3x/week - let sit at least 5 minutes prior to rinsing (Patient not taking: No sig reported), Disp: 120 mL, Rfl: 5    methocarbamoL (ROBAXIN) 500 MG Tab, Take 1 tablet (500 mg total) by mouth 4 (four) times daily. For muscle spasms for 10 days, Disp: 40 tablet, Rfl: 0    promethazine (PHENERGAN) 25 MG tablet, Take 1 tablet (25 mg total) by mouth every 6 (six) hours as needed for Nausea. (Patient not taking: No sig reported), Disp: 12 tablet, Rfl: 0    traMADoL (ULTRAM) 50 mg tablet, Take 1-2 tablets ( mg total) by mouth every 6 (six) hours as needed for Pain. (Patient not taking: No sig reported), Disp: 21 tablet, Rfl: 0    triamcinolone acetonide 0.1% (KENALOG) 0.1 % cream, Apply topically 2 (two) times daily as needed (rash on neck). Avoid use on face, body folds, groin/genitalia. (Patient not taking: No sig reported), Disp: 45 g, Rfl: 3    Review of Systems   Constitutional: Negative for activity change, appetite change, chills, diaphoresis, fatigue, fever, unexpected weight change and weight loss.   HENT: Negative for congestion, ear discharge, ear pain, facial swelling, hearing loss, nosebleeds, postnasal drip, rhinorrhea, sinus pressure, sneezing, sore throat, tinnitus, trouble swallowing and voice change.    Eyes: Negative for photophobia, pain, discharge, redness, itching and visual disturbance.   Respiratory: Negative for cough, chest tightness, shortness of breath and wheezing.    Cardiovascular: Negative for chest pain, palpitations and leg swelling.   Gastrointestinal: Negative for abdominal distention, abdominal pain, anal bleeding, blood in stool, bowel incontinence, constipation, diarrhea, nausea, rectal pain and vomiting.    Endocrine: Negative for cold intolerance, heat intolerance, polydipsia, polyphagia and polyuria.   Genitourinary: Negative for bladder incontinence, difficulty urinating, dysuria, flank pain and pelvic pain.   Musculoskeletal: Positive for back pain. Negative for arthralgias, joint swelling, myalgias and neck pain.   Skin: Negative for rash.   Neurological: Negative for dizziness, tingling, tremors, seizures, syncope, speech difficulty, weakness, light-headedness, numbness, headaches and paresthesias.   Psychiatric/Behavioral: Negative for behavioral problems, confusion, decreased concentration, dysphoric mood, sleep disturbance and suicidal ideas. The patient is not nervous/anxious and is not hyperactive.        Lab Results   Component Value Date    WBC 5.67 12/27/2021    HGB 12.7 (L) 12/27/2021    HCT 41.0 12/27/2021    MCV 75 (L) 12/27/2021     12/27/2021         CMP  Sodium   Date Value Ref Range Status   12/27/2021 138 136 - 145 mmol/L Final     Potassium   Date Value Ref Range Status   12/27/2021 4.3 3.5 - 5.1 mmol/L Final     Chloride   Date Value Ref Range Status   12/27/2021 104 95 - 110 mmol/L Final     CO2   Date Value Ref Range Status   12/27/2021 28 23 - 29 mmol/L Final     Glucose   Date Value Ref Range Status   12/27/2021 105 70 - 110 mg/dL Final     BUN   Date Value Ref Range Status   12/27/2021 12 6 - 20 mg/dL Final     Creatinine   Date Value Ref Range Status   12/27/2021 0.8 0.5 - 1.4 mg/dL Final     Calcium   Date Value Ref Range Status   12/27/2021 9.1 8.7 - 10.5 mg/dL Final     Total Protein   Date Value Ref Range Status   12/27/2021 7.4 6.0 - 8.4 g/dL Final     Albumin   Date Value Ref Range Status   12/27/2021 4.1 3.5 - 5.2 g/dL Final   09/27/2021 4.1 3.6 - 5.1 g/dL Final     Total Bilirubin   Date Value Ref Range Status   12/27/2021 0.6 0.1 - 1.0 mg/dL Final     Comment:     For infants and newborns, interpretation of results should be based  on gestational age, weight and in  "agreement with clinical  observations.    Premature Infant recommended reference ranges:  Up to 24 hours.............<8.0 mg/dL  Up to 48 hours............<12.0 mg/dL  3-5 days..................<15.0 mg/dL  6-29 days.................<15.0 mg/dL       Alkaline Phosphatase   Date Value Ref Range Status   12/27/2021 46 (L) 55 - 135 U/L Final     AST   Date Value Ref Range Status   12/27/2021 54 (H) 10 - 40 U/L Final     ALT   Date Value Ref Range Status   12/27/2021 52 (H) 10 - 44 U/L Final     Anion Gap   Date Value Ref Range Status   12/27/2021 6 (L) 8 - 16 mmol/L Final     eGFR if    Date Value Ref Range Status   12/27/2021 >60.0 >60 mL/min/1.73 m^2 Final     eGFR if non    Date Value Ref Range Status   12/27/2021 >60.0 >60 mL/min/1.73 m^2 Final     Comment:     Calculation used to obtain the estimated glomerular filtration  rate (eGFR) is the CKD-EPI equation.           Lab Results   Component Value Date    HGBA1C 6.3 (H) 12/27/2021        Objective:     Vitals:    04/11/22 1344   BP: 134/78   Pulse: 66   Temp: 97.6 °F (36.4 °C)   TempSrc: Oral   SpO2: 99%   Weight: (!) 150.4 kg (331 lb 9.2 oz)   Height: 5' 10" (1.778 m)   PainSc: 0-No pain     Body mass index is 47.58 kg/m².    Physical Exam  Vitals and nursing note reviewed.   Constitutional:       Appearance: He is obese.   HENT:      Head: Normocephalic and atraumatic.      Right Ear: Tympanic membrane, ear canal and external ear normal. There is no impacted cerumen.      Left Ear: Tympanic membrane, ear canal and external ear normal. There is no impacted cerumen.   Cardiovascular:      Rate and Rhythm: Normal rate and regular rhythm.      Heart sounds: Normal heart sounds. No murmur heard.  Musculoskeletal:      Thoracic back: Normal.      Lumbar back: Spasms and tenderness present. No swelling, edema, deformity, signs of trauma, lacerations or bony tenderness. Normal range of motion. Negative right straight leg raise test and " negative left straight leg raise test. No scoliosis.   Neurological:      Mental Status: He is alert and oriented to person, place, and time.   Psychiatric:         Mood and Affect: Mood normal.         Behavior: Behavior normal.         Assessment and Plan:   Topher was seen today for back pain.    Diagnoses and all orders for this visit:    Dorsalgia    Class 3 severe obesity due to excess calories without serious comorbidity with body mass index (BMI) of 45.0 to 49.9 in adult    Other orders  -     methocarbamoL (ROBAXIN) 500 MG Tab; Take 1 tablet (500 mg total) by mouth 4 (four) times daily. For muscle spasms for 10 days  -     diclofenac (VOLTAREN) 75 MG EC tablet; Take 1 tablet (75 mg total) by mouth 2 (two) times daily. w food and water for 5-15 days.      Get MRI records.  Consider referral.  Anti-inflammatories as above.  Consider physical therapy    Significant weight loss needed by calorie restriction and exercise.    Follow-up if not improving in 2 weeks.    Will review MRI records and consider further imaging needed verses referral sooner rather than later  No follow-ups on file.    THIS NOTE WILL BE SHARED WITH THE PATIENT.           No

## 2025-02-03 NOTE — PATIENT PROFILE ADULT - FALL HARM RISK - PATIENT NEEDS ASSISTANCE
April 5, 2017      Rohan Harrell MD  [address] [place] [address]  [address]  [place] [address]           [place] [facility]  [address] [facility]  [place] [address]  Phone: [phone]  Fax: [phone]          Patient: Bharath Wilson   MR Number: [id number]   YOB: 1929   Date of Visit: 4/5/2017       Dear Dr. Rohan Harrell:    Thank you for referring Bharath Wilson to me for evaluation. Attached you will find relevant portions of my assessment and plan of care.    If you have questions, please do not hesitate to call me. I look forward to following Bharath Wilson along with you.    Sincerely,    Katie Andersen MD    Enclosure  CC:  No Recipients    If you would like to receive this communication electronically, please contact [email or web address] or [phone] to request more information on [facility] access.    For providers and/or their staff who would like to refer a patient to [facility], please contact us through our one-stop-shop provider referral line, [facility] at [phone].    If you feel you have received this communication in error or would no longer like to receive these types of communications, please e-mail [email or web address]         
No assistance needed

## 2025-02-03 NOTE — PATIENT PROFILE ADULT - FUNCTIONAL SCREEN CURRENT LEVEL: COMMUNICATION, MLM
[FreeTextEntry1] : This is a 51 year old female with a history of multiple sclerosis with chief complaint of left sided body pain which has been ongoing for several years. Patient reports persistent chronic pain.She also reports that current medication regimen of Gabapentin,Amitriptyline and Baclofen provide at least 50% pain relief and allows her to perform her ADLs. Therefore,we will continue with no changes. All this patients questions were answered and the conversation was understood well.She is to f/u in 3 month for re-evaluation.\par \par Dwayne Salvador PA-C\par Rea Champion M.D., [facility]\par \par \par  0 = understands/communicates without difficulty

## 2025-02-03 NOTE — H&P ADULT - HISTORY OF PRESENT ILLNESS
Pt is a 39-year-old female history of type 1 diabetes on insulin, hypertension on losartan, presenting for evaluation of "feeling off ".  Noted to have a blood sugar of greater than 600 on initial fingerstick.  She has not been having any fevers or chills that she has noticed, no cough congestion sore throat or runny nose.  No diarrhea, no urinary symptoms.  States she has been in the ICU with DKA 3 times in the past, intubated once approximately a year ago.  Notes that she sometimes forgets to do her insulin as she self injects, is not sure if she has missed any doses recently, states she is busy at home with two children and doesn't recall.

## 2025-02-03 NOTE — CONSULT NOTE ADULT - ASSESSMENT
39-year-old female history of type 1 diabetes on insulin, hypertension on losartan, presenting for evaluation of "feeling off ".  Noted to have a blood sugar of greater than 600 on initial fingerstick.  She has not been having any fevers or chills that she has noticed, no cough congestion sore throat or runny nose.  No diarrhea, no urinary symptoms.  States she has been in the ICU with DKA 3 times in the past, intubated once approximately a year ago.  Notes that she sometimes forgets to do her insulin as she self injects, is not sure if she has missed any doses recently, states she is busy at home with two children and doesn't recall. MICU was consult for DKA.  Pt reports taking short acting Insulin 8-8-8 after meal and long acting 10U at bedtime. Pt reports she missed dose some times because "pt is busy" but not sure how often.  Pt is currently AAOx4, HDS.     # DKA, T1DM, 2/2 possible medication non compliant     Rec:  Give fluid bolus  Start long-acting insulin and insulin sliding scale  Endocrinology cs   repeat BMP     Dispo: SDU

## 2025-02-03 NOTE — ED PROVIDER NOTE - OBJECTIVE STATEMENT
39-year-old female history of type 1 diabetes on insulin, hypertension on losartan, presenting for evaluation of "feeling off ".  Noted to have a blood sugar of greater than 600 on initial fingerstick.  She has not been having any fevers or chills that she has noticed, no cough congestion sore throat or runny nose.  No diarrhea, no urinary symptoms.  States she has been in the ICU with DKA 3 times in the past, intubated once approximately a year ago.  Notes that she sometimes forgets to do her insulin as she self injects, is not sure if she has missed any doses recently, states she is busy at home with two children and doesn't recall.

## 2025-02-04 LAB
A1C WITH ESTIMATED AVERAGE GLUCOSE RESULT: 13.8 % — HIGH (ref 4–5.6)
ALBUMIN SERPL ELPH-MCNC: 3.3 G/DL — SIGNIFICANT CHANGE UP (ref 3.3–5.2)
ALBUMIN SERPL ELPH-MCNC: 3.3 G/DL — SIGNIFICANT CHANGE UP (ref 3.3–5.2)
ALBUMIN SERPL ELPH-MCNC: 3.5 G/DL — SIGNIFICANT CHANGE UP (ref 3.3–5.2)
ALBUMIN SERPL ELPH-MCNC: 3.5 G/DL — SIGNIFICANT CHANGE UP (ref 3.3–5.2)
ALP SERPL-CCNC: 78 U/L — SIGNIFICANT CHANGE UP (ref 40–120)
ALP SERPL-CCNC: 80 U/L — SIGNIFICANT CHANGE UP (ref 40–120)
ALP SERPL-CCNC: 82 U/L — SIGNIFICANT CHANGE UP (ref 40–120)
ALP SERPL-CCNC: 83 U/L — SIGNIFICANT CHANGE UP (ref 40–120)
ALT FLD-CCNC: 13 U/L — SIGNIFICANT CHANGE UP
ALT FLD-CCNC: 14 U/L — SIGNIFICANT CHANGE UP
ALT FLD-CCNC: 15 U/L — SIGNIFICANT CHANGE UP
ALT FLD-CCNC: 18 U/L — SIGNIFICANT CHANGE UP
ANION GAP SERPL CALC-SCNC: 12 MMOL/L — SIGNIFICANT CHANGE UP (ref 5–17)
ANION GAP SERPL CALC-SCNC: 13 MMOL/L — SIGNIFICANT CHANGE UP (ref 5–17)
ANION GAP SERPL CALC-SCNC: 13 MMOL/L — SIGNIFICANT CHANGE UP (ref 5–17)
ANION GAP SERPL CALC-SCNC: 14 MMOL/L — SIGNIFICANT CHANGE UP (ref 5–17)
AST SERPL-CCNC: 22 U/L — SIGNIFICANT CHANGE UP
AST SERPL-CCNC: 23 U/L — SIGNIFICANT CHANGE UP
AST SERPL-CCNC: 23 U/L — SIGNIFICANT CHANGE UP
AST SERPL-CCNC: 25 U/L — SIGNIFICANT CHANGE UP
BILIRUB SERPL-MCNC: 0.2 MG/DL — LOW (ref 0.4–2)
BILIRUB SERPL-MCNC: 0.2 MG/DL — LOW (ref 0.4–2)
BILIRUB SERPL-MCNC: 0.3 MG/DL — LOW (ref 0.4–2)
BILIRUB SERPL-MCNC: 0.4 MG/DL — SIGNIFICANT CHANGE UP (ref 0.4–2)
BUN SERPL-MCNC: 12.5 MG/DL — SIGNIFICANT CHANGE UP (ref 8–20)
BUN SERPL-MCNC: 14.7 MG/DL — SIGNIFICANT CHANGE UP (ref 8–20)
BUN SERPL-MCNC: 15.3 MG/DL — SIGNIFICANT CHANGE UP (ref 8–20)
BUN SERPL-MCNC: 15.5 MG/DL — SIGNIFICANT CHANGE UP (ref 8–20)
CALCIUM SERPL-MCNC: 8.1 MG/DL — LOW (ref 8.4–10.5)
CALCIUM SERPL-MCNC: 8.1 MG/DL — LOW (ref 8.4–10.5)
CALCIUM SERPL-MCNC: 8.2 MG/DL — LOW (ref 8.4–10.5)
CALCIUM SERPL-MCNC: 8.4 MG/DL — SIGNIFICANT CHANGE UP (ref 8.4–10.5)
CHLORIDE SERPL-SCNC: 103 MMOL/L — SIGNIFICANT CHANGE UP (ref 96–108)
CHLORIDE SERPL-SCNC: 104 MMOL/L — SIGNIFICANT CHANGE UP (ref 96–108)
CHLORIDE SERPL-SCNC: 105 MMOL/L — SIGNIFICANT CHANGE UP (ref 96–108)
CHLORIDE SERPL-SCNC: 105 MMOL/L — SIGNIFICANT CHANGE UP (ref 96–108)
CO2 SERPL-SCNC: 16 MMOL/L — LOW (ref 22–29)
CO2 SERPL-SCNC: 16 MMOL/L — LOW (ref 22–29)
CO2 SERPL-SCNC: 17 MMOL/L — LOW (ref 22–29)
CO2 SERPL-SCNC: 19 MMOL/L — LOW (ref 22–29)
CREAT SERPL-MCNC: 0.61 MG/DL — SIGNIFICANT CHANGE UP (ref 0.5–1.3)
CREAT SERPL-MCNC: 0.65 MG/DL — SIGNIFICANT CHANGE UP (ref 0.5–1.3)
CREAT SERPL-MCNC: 0.66 MG/DL — SIGNIFICANT CHANGE UP (ref 0.5–1.3)
CREAT SERPL-MCNC: 0.68 MG/DL — SIGNIFICANT CHANGE UP (ref 0.5–1.3)
CULTURE RESULTS: SIGNIFICANT CHANGE UP
EGFR: 112 ML/MIN/1.73M2 — SIGNIFICANT CHANGE UP
EGFR: 113 ML/MIN/1.73M2 — SIGNIFICANT CHANGE UP
EGFR: 113 ML/MIN/1.73M2 — SIGNIFICANT CHANGE UP
EGFR: 115 ML/MIN/1.73M2 — SIGNIFICANT CHANGE UP
ESTIMATED AVERAGE GLUCOSE: 349 MG/DL — HIGH (ref 68–114)
GAS PNL BLDV: SIGNIFICANT CHANGE UP
GLUCOSE BLDC GLUCOMTR-MCNC: 140 MG/DL — HIGH (ref 70–99)
GLUCOSE BLDC GLUCOMTR-MCNC: 153 MG/DL — HIGH (ref 70–99)
GLUCOSE BLDC GLUCOMTR-MCNC: 179 MG/DL — HIGH (ref 70–99)
GLUCOSE BLDC GLUCOMTR-MCNC: 179 MG/DL — HIGH (ref 70–99)
GLUCOSE BLDC GLUCOMTR-MCNC: 182 MG/DL — HIGH (ref 70–99)
GLUCOSE BLDC GLUCOMTR-MCNC: 186 MG/DL — HIGH (ref 70–99)
GLUCOSE BLDC GLUCOMTR-MCNC: 192 MG/DL — HIGH (ref 70–99)
GLUCOSE BLDC GLUCOMTR-MCNC: 198 MG/DL — HIGH (ref 70–99)
GLUCOSE BLDC GLUCOMTR-MCNC: 217 MG/DL — HIGH (ref 70–99)
GLUCOSE SERPL-MCNC: 174 MG/DL — HIGH (ref 70–99)
GLUCOSE SERPL-MCNC: 177 MG/DL — HIGH (ref 70–99)
GLUCOSE SERPL-MCNC: 205 MG/DL — HIGH (ref 70–99)
GLUCOSE SERPL-MCNC: 246 MG/DL — HIGH (ref 70–99)
HCT VFR BLD CALC: 32.3 % — LOW (ref 34.5–45)
HGB BLD-MCNC: 11 G/DL — LOW (ref 11.5–15.5)
MAGNESIUM SERPL-MCNC: 1.7 MG/DL — SIGNIFICANT CHANGE UP (ref 1.6–2.6)
MAGNESIUM SERPL-MCNC: 1.7 MG/DL — SIGNIFICANT CHANGE UP (ref 1.6–2.6)
MAGNESIUM SERPL-MCNC: 2.2 MG/DL — SIGNIFICANT CHANGE UP (ref 1.6–2.6)
MCHC RBC-ENTMCNC: 31.1 PG — SIGNIFICANT CHANGE UP (ref 27–34)
MCHC RBC-ENTMCNC: 34.1 G/DL — SIGNIFICANT CHANGE UP (ref 32–36)
MCV RBC AUTO: 91.2 FL — SIGNIFICANT CHANGE UP (ref 80–100)
PHOSPHATE SERPL-MCNC: 1.6 MG/DL — LOW (ref 2.4–4.7)
PHOSPHATE SERPL-MCNC: 1.8 MG/DL — LOW (ref 2.4–4.7)
PHOSPHATE SERPL-MCNC: 2.6 MG/DL — SIGNIFICANT CHANGE UP (ref 2.4–4.7)
PLATELET # BLD AUTO: 244 K/UL — SIGNIFICANT CHANGE UP (ref 150–400)
POTASSIUM SERPL-MCNC: 4 MMOL/L — SIGNIFICANT CHANGE UP (ref 3.5–5.3)
POTASSIUM SERPL-MCNC: 4.1 MMOL/L — SIGNIFICANT CHANGE UP (ref 3.5–5.3)
POTASSIUM SERPL-MCNC: 4.2 MMOL/L — SIGNIFICANT CHANGE UP (ref 3.5–5.3)
POTASSIUM SERPL-MCNC: 4.5 MMOL/L — SIGNIFICANT CHANGE UP (ref 3.5–5.3)
POTASSIUM SERPL-SCNC: 4 MMOL/L — SIGNIFICANT CHANGE UP (ref 3.5–5.3)
POTASSIUM SERPL-SCNC: 4.1 MMOL/L — SIGNIFICANT CHANGE UP (ref 3.5–5.3)
POTASSIUM SERPL-SCNC: 4.2 MMOL/L — SIGNIFICANT CHANGE UP (ref 3.5–5.3)
POTASSIUM SERPL-SCNC: 4.5 MMOL/L — SIGNIFICANT CHANGE UP (ref 3.5–5.3)
PROT SERPL-MCNC: 5.9 G/DL — LOW (ref 6.6–8.7)
PROT SERPL-MCNC: 6 G/DL — LOW (ref 6.6–8.7)
PROT SERPL-MCNC: 6 G/DL — LOW (ref 6.6–8.7)
PROT SERPL-MCNC: 6.3 G/DL — LOW (ref 6.6–8.7)
RBC # BLD: 3.54 M/UL — LOW (ref 3.8–5.2)
RBC # FLD: 12 % — SIGNIFICANT CHANGE UP (ref 10.3–14.5)
SODIUM SERPL-SCNC: 133 MMOL/L — LOW (ref 135–145)
SODIUM SERPL-SCNC: 134 MMOL/L — LOW (ref 135–145)
SODIUM SERPL-SCNC: 134 MMOL/L — LOW (ref 135–145)
SODIUM SERPL-SCNC: 135 MMOL/L — SIGNIFICANT CHANGE UP (ref 135–145)
SPECIMEN SOURCE: SIGNIFICANT CHANGE UP
WBC # BLD: 12.73 K/UL — HIGH (ref 3.8–10.5)
WBC # FLD AUTO: 12.73 K/UL — HIGH (ref 3.8–10.5)

## 2025-02-04 PROCEDURE — 99223 1ST HOSP IP/OBS HIGH 75: CPT

## 2025-02-04 PROCEDURE — 99222 1ST HOSP IP/OBS MODERATE 55: CPT

## 2025-02-04 RX ORDER — SODIUM CHLORIDE 9 G/ML
1000 INJECTION, SOLUTION INTRAVENOUS ONCE
Refills: 0 | Status: COMPLETED | OUTPATIENT
Start: 2025-02-04 | End: 2025-02-04

## 2025-02-04 RX ORDER — MAGNESIUM SULFATE 0.8 MEQ/ML
2 AMPUL (ML) INJECTION ONCE
Refills: 0 | Status: COMPLETED | OUTPATIENT
Start: 2025-02-04 | End: 2025-02-04

## 2025-02-04 RX ORDER — INSULIN LISPRO 100/ML
8 VIAL (ML) SUBCUTANEOUS
Refills: 0 | Status: DISCONTINUED | OUTPATIENT
Start: 2025-02-04 | End: 2025-02-04

## 2025-02-04 RX ORDER — DM/PSEUDOEPHED/ACETAMINOPHEN 10-30-250
12.5 CAPSULE ORAL ONCE
Refills: 0 | Status: DISCONTINUED | OUTPATIENT
Start: 2025-02-04 | End: 2025-02-05

## 2025-02-04 RX ORDER — SODIUM CHLORIDE 9 G/ML
1000 INJECTION, SOLUTION INTRAVENOUS
Refills: 0 | Status: DISCONTINUED | OUTPATIENT
Start: 2025-02-04 | End: 2025-02-05

## 2025-02-04 RX ORDER — DM/PSEUDOEPHED/ACETAMINOPHEN 10-30-250
15 CAPSULE ORAL ONCE
Refills: 0 | Status: DISCONTINUED | OUTPATIENT
Start: 2025-02-04 | End: 2025-02-05

## 2025-02-04 RX ORDER — INSULIN LISPRO 100/ML
8 VIAL (ML) SUBCUTANEOUS
Refills: 0 | Status: DISCONTINUED | OUTPATIENT
Start: 2025-02-04 | End: 2025-02-05

## 2025-02-04 RX ORDER — INSULIN LISPRO 100/ML
VIAL (ML) SUBCUTANEOUS
Refills: 0 | Status: DISCONTINUED | OUTPATIENT
Start: 2025-02-04 | End: 2025-02-04

## 2025-02-04 RX ORDER — INSULIN GLARGINE-YFGN 100 [IU]/ML
16 INJECTION, SOLUTION SUBCUTANEOUS EVERY MORNING
Refills: 0 | Status: DISCONTINUED | OUTPATIENT
Start: 2025-02-05 | End: 2025-02-05

## 2025-02-04 RX ORDER — SODIUM PHOSPHATE, DIBASIC, ANHYDROUS, POTASSIUM PHOSPHATE, MONOBASIC, AND SODIUM PHOSPHATE, MONOBASIC, MONOHYDRATE 852; 155; 130 MG/1; MG/1; MG/1
2 TABLET, COATED ORAL ONCE
Refills: 0 | Status: COMPLETED | OUTPATIENT
Start: 2025-02-04 | End: 2025-02-04

## 2025-02-04 RX ORDER — INSULIN GLARGINE-YFGN 100 [IU]/ML
12 INJECTION, SOLUTION SUBCUTANEOUS AT BEDTIME
Refills: 0 | Status: DISCONTINUED | OUTPATIENT
Start: 2025-02-04 | End: 2025-02-04

## 2025-02-04 RX ORDER — ACETAMINOPHEN 160 MG/5ML
650 SUSPENSION ORAL EVERY 6 HOURS
Refills: 0 | Status: DISCONTINUED | OUTPATIENT
Start: 2025-02-04 | End: 2025-02-05

## 2025-02-04 RX ORDER — DM/PSEUDOEPHED/ACETAMINOPHEN 10-30-250
25 CAPSULE ORAL ONCE
Refills: 0 | Status: DISCONTINUED | OUTPATIENT
Start: 2025-02-04 | End: 2025-02-05

## 2025-02-04 RX ORDER — INSULIN GLARGINE-YFGN 100 [IU]/ML
12 INJECTION, SOLUTION SUBCUTANEOUS ONCE
Refills: 0 | Status: DISCONTINUED | OUTPATIENT
Start: 2025-02-04 | End: 2025-02-04

## 2025-02-04 RX ORDER — GLUCAGON 3 MG/1
1 POWDER NASAL ONCE
Refills: 0 | Status: DISCONTINUED | OUTPATIENT
Start: 2025-02-04 | End: 2025-02-05

## 2025-02-04 RX ORDER — INSULIN GLARGINE-YFGN 100 [IU]/ML
16 INJECTION, SOLUTION SUBCUTANEOUS ONCE
Refills: 0 | Status: COMPLETED | OUTPATIENT
Start: 2025-02-04 | End: 2025-02-04

## 2025-02-04 RX ORDER — INSULIN LISPRO 100/ML
VIAL (ML) SUBCUTANEOUS
Refills: 0 | Status: DISCONTINUED | OUTPATIENT
Start: 2025-02-04 | End: 2025-02-05

## 2025-02-04 RX ADMIN — Medication 25 GRAM(S): at 02:56

## 2025-02-04 RX ADMIN — Medication 1: at 17:13

## 2025-02-04 RX ADMIN — ANTISEPTIC SURGICAL SCRUB 1 APPLICATION(S): 0.04 SOLUTION TOPICAL at 12:01

## 2025-02-04 RX ADMIN — Medication 8 UNIT(S): at 12:25

## 2025-02-04 RX ADMIN — Medication 1: at 12:26

## 2025-02-04 RX ADMIN — Medication 2 UNIT(S)/HR: at 06:55

## 2025-02-04 RX ADMIN — Medication 8 UNIT(S): at 17:13

## 2025-02-04 RX ADMIN — SODIUM CHLORIDE 1000 MILLILITER(S): 9 INJECTION, SOLUTION INTRAVENOUS at 06:54

## 2025-02-04 RX ADMIN — SODIUM PHOSPHATE, DIBASIC, ANHYDROUS, POTASSIUM PHOSPHATE, MONOBASIC, AND SODIUM PHOSPHATE, MONOBASIC, MONOHYDRATE 2 PACKET(S): 852; 155; 130 TABLET, COATED ORAL at 02:56

## 2025-02-04 RX ADMIN — INSULIN GLARGINE-YFGN 16 UNIT(S): 100 INJECTION, SOLUTION SUBCUTANEOUS at 10:15

## 2025-02-04 RX ADMIN — ENOXAPARIN SODIUM 40 MILLIGRAM(S): 100 INJECTION SUBCUTANEOUS at 06:54

## 2025-02-04 NOTE — CONSULT NOTE ADULT - SUBJECTIVE AND OBJECTIVE BOX
HPI:  Pt is a 39-year-old female history of type 1 diabetes on insulin, hypertension on losartan, presenting for evaluation of "feeling off ".  Noted to have a blood sugar of greater than 600 on initial fingerstick.  She has not been having any fevers or chills that she has noticed, no cough congestion sore throat or runny nose.  No diarrhea, no urinary symptoms.  States she has been in the ICU with DKA 3 times in the past, intubated once approximately a year ago.  Notes that she sometimes forgets to do her insulin as she self injects, is not sure if she has missed any doses recently, states she is busy at home with two children and doesn't recall (03 Feb 2025 16:46) Admitted to MICU with DKA due to insulin non-adherence. sp insulin gtt and IVhydration and downgraded to floors    Endocrine consulted re: uncontrolled T1DM  T1DM since 2018, no outpatient endocrinologist, follows with PCP, She does not take her insulin regularly and takes Humalog after meals  She does not test sugars with glucometer but wearing Dexcom CGM and reports lows and highs  Outpt regimen: Basaglar 10 units, Humalog 8 units with meals  Admits to non-adherence with diabetic diet, recently drinking a lot of juice to quench thirst.    REVIEW OF SYSTEMS:  CONSTITUTIONAL:  No weakness, fevers or chills, (+) weight loss  HEAD: No headache  EYES:  (+)  blurry vision  NECK:  No anterior neck pain, dysphagia or voice changes  RESPIRATORY:  No cough. No wheezing. No shortness of breath  CARDIOVASCULAR:  No chest pain. No palpitations  GASTROINTESTINAL:  No abdominal or epigastric pain. No nausea or vomiting; No diarrhea or constipation.   GENITOURINARY:  No dysuria, frequency or hematuria  NEUROLOGICAL:  No numbness in extremities  ENDO: (+) excessive thirst. (+)  nocturia. No dizziness  SKIN:  No itching, rashes    PAST MEDICAL & SURGICAL HISTORY:  DM (diabetes mellitus)  Diabetes mellitus  History of tonsillectomy    FAMILY HISTORY: no DM    SOCIAL HISTORY: denies tobacco, illicit drugs, (+) social EtOh use    MEDICATIONS  (STANDING):  chlorhexidine 2% Cloths 1 Application(s) Topical <User Schedule>  dextrose 5%. 1000 milliLiter(s) (100 mL/Hr) IV Continuous <Continuous>  dextrose 5%. 1000 milliLiter(s) (50 mL/Hr) IV Continuous <Continuous>  dextrose 50% Injectable 25 Gram(s) IV Push once  dextrose 50% Injectable 12.5 Gram(s) IV Push once  dextrose 50% Injectable 25 Gram(s) IV Push once  enoxaparin Injectable 40 milliGRAM(s) SubCutaneous every 24 hours  glucagon  Injectable 1 milliGRAM(s) IntraMuscular once  influenza   Vaccine 0.5 milliLiter(s) IntraMuscular once  insulin lispro (ADMELOG) corrective regimen sliding scale   SubCutaneous three times a day before meals  insulin lispro Injectable (ADMELOG) 8 Unit(s) SubCutaneous three times a day before meals    MEDICATIONS  (PRN):  acetaminophen     Tablet .. 650 milliGRAM(s) Oral every 6 hours PRN Temp greater or equal to 38C (100.4F), Mild Pain (1 - 3)  dextrose Oral Gel 15 Gram(s) Oral once PRN Blood Glucose LESS THAN 70 milliGRAM(s)/deciliter    ALLERGIES: No Known Allergies    Vital Signs Last 24 Hrs  T(C): 36.6 (04 Feb 2025 11:27), Max: 37 (04 Feb 2025 05:00)  T(F): 97.9 (04 Feb 2025 11:27), Max: 98.6 (04 Feb 2025 05:00)  HR: 82 (04 Feb 2025 11:00) (82 - 111)  BP: 116/79 (04 Feb 2025 11:00) (96/60 - 119/74)  BP(mean): 91 (04 Feb 2025 11:00) (72 - 96)  RR: 18 (04 Feb 2025 11:00) (16 - 23)  SpO2: 100% (04 Feb 2025 11:00) (96% - 100%)    Parameters below as of 04 Feb 2025 11:00  Patient On (Oxygen Delivery Method): room air    Physical Exam:  General appearance: Well developed, NAD  Eyes: EOMI  Lungs: Normal respiratory excursion. Lungs clear no w/r/r  CV: Normal S1S2, regular. No m/r/g.  Pedal pulses intact.  Abdomen: Soft, nontender, nondistended, (+) BS  Musculoskeletal: No cyanosis, clubbing, or edema.  Skin: Warm and moist. No acanthosis. Feet - no ulcers  Neuro: Cranial nerves intact. Good sensation to light touch.    Psych: Normal affect, poor judgement/insight      LABS:                        11.0   12.73 )-----------( 244      ( 04 Feb 2025 01:55 )             32.3     02-04    135  |  104  |  12.5  ----------------------------<  205[H]  4.0   |  19.0[L]  |  0.61    Ca    8.1[L]      04 Feb 2025 10:20  Phos  2.6     02-04  Mg     2.2     02-04    TPro  5.9[L]  /  Alb  3.3  /  TBili  0.4  /  DBili  x   /  AST  22  /  ALT  13  /  AlkPhos  80  02-04    LIVER FUNCTIONS - ( 04 Feb 2025 10:20 )  Alb: 3.3 g/dL / Pro: 5.9 g/dL / ALK PHOS: 80 U/L / ALT: 13 U/L / AST: 22 U/L / GGT: x           A1C with Estimated Average Glucose Result: 13.8 % (02-04-25 @ 05:30)      CAPILLARY BLOOD GLUCOSE  POCT Blood Glucose.: 186 mg/dL (04 Feb 2025 09:35)  POCT Blood Glucose.: 192 mg/dL (04 Feb 2025 07:34)  POCT Blood Glucose.: 198 mg/dL (04 Feb 2025 06:26)  POCT Blood Glucose.: 217 mg/dL (04 Feb 2025 05:23)  POCT Blood Glucose.: 179 mg/dL (04 Feb 2025 03:52)  POCT Blood Glucose.: 182 mg/dL (04 Feb 2025 02:54)  POCT Blood Glucose.: 182 mg/dL (04 Feb 2025 01:50)  POCT Blood Glucose.: 153 mg/dL (04 Feb 2025 00:50)  POCT Blood Glucose.: 170 mg/dL (03 Feb 2025 23:45)  POCT Blood Glucose.: 169 mg/dL (03 Feb 2025 22:46)  POCT Blood Glucose.: 174 mg/dL (03 Feb 2025 21:43)  POCT Blood Glucose.: 215 mg/dL (03 Feb 2025 20:46)  POCT Blood Glucose.: 223 mg/dL (03 Feb 2025 19:41)  POCT Blood Glucose.: 306 mg/dL (03 Feb 2025 18:43)  POCT Blood Glucose.: 374 mg/dL (03 Feb 2025 17:41)  POCT Blood Glucose.: 476 mg/dL (03 Feb 2025 16:44)  POCT Blood Glucose.: >600 mg/dL (03 Feb 2025 11:30)  POCT Blood Glucose.: >600 mg/dL (03 Feb 2025 11:29)            
BRIEF HOSPITAL COURSE SUMMARY: Pt is a 39-year-old female history of type 1 diabetes on insulin, hypertension on losartan, presenting for evaluation of "feeling off ".  Noted to have a blood sugar of greater than 600 on initial fingerstick.  She has not been having any fevers or chills that she has noticed, no cough congestion sore throat or runny nose.  No diarrhea, no urinary symptoms.  States she has been in the ICU with DKA 3 times in the past, intubated once approximately a year ago.  Notes that she sometimes forgets to do her insulin as she self injects, is not sure if she has missed any doses recently, states she is busy at home with two children and doesn't recall.    OBJECTIVE  Vital Signs Last 24 Hrs  T(C): 36.5 (03 Feb 2025 10:31), Max: 36.5 (03 Feb 2025 10:31)  T(F): 97.7 (03 Feb 2025 10:31), Max: 97.7 (03 Feb 2025 10:31)  HR: 104 (03 Feb 2025 15:23) (87 - 104)  BP: 116/80 (03 Feb 2025 15:23) (116/80 - 116/80)  BP(mean): --  RR: 16 (03 Feb 2025 15:23) (16 - 16)  SpO2: 100% (03 Feb 2025 15:23) (99% - 100%)    Parameters below as of 03 Feb 2025 15:23  Patient On (Oxygen Delivery Method): room air        PHYSICAL EXAM:  Const: Awake, alert and oriented. In no acute distress. Well appearing.  HEENT: NC/AT. Moist mucous membranes.  Eyes: No scleral icterus. EOMI.  Neck:. Soft and supple. Full ROM without pain.  Cardiac: Regular rate and rhythm. +S1/S2. No murmurs. Peripheral pulses 2+ and symmetric. No LE edema.  Resp: Speaking in full sentences. No evidence of respiratory distress. No wheezes, rales or rhonchi.  Abd: Soft, non-tender, non-distended. Normal bowel sounds in all 4 quadrants. No guarding or rebound.  Back: Spine midline and non-tender. No CVAT.  Neuro: no gross deficits, moving all extremities, normal speech  Skin: No rashes, abrasions or lacerations.      LABS:                        13.1   10.35 )-----------( 259      ( 03 Feb 2025 12:41 )             38.6     02-03    130[L]  |  89[L]  |  20.9[H]  ----------------------------<  617[HH]  5.0   |  12.0[L]  |  0.85    Ca    9.1      03 Feb 2025 12:41    TPro  7.4  /  Alb  4.1  /  TBili  0.5  /  DBili  x   /  AST  32[H]  /  ALT  20  /  AlkPhos  106  02-03    PT/INR - ( 03 Feb 2025 12:41 )   PT: 11.8 sec;   INR: 1.02 ratio         PTT - ( 03 Feb 2025 12:41 )  PTT:27.1 sec  Urinalysis Basic - ( 03 Feb 2025 12:41 )    Color: x / Appearance: x / SG: x / pH: x  Gluc: 617 mg/dL / Ketone: x  / Bili: x / Urobili: x   Blood: x / Protein: x / Nitrite: x   Leuk Esterase: x / RBC: x / WBC x   Sq Epi: x / Non Sq Epi: x / Bacteria: x        I/Os:      RADIOLOGY & ADDITIONAL TESTS:    MEDICATIONS  (STANDING):    MEDICATIONS  (PRN):  
PMD : Dr. Mark Naidu     Patient is a 39y old  Female who presents with a chief complaint of DKA, type 1 DM (04 Feb 2025 10:47)      HPI:  Pt is a 39-year-old female history of type 1 diabetes on insulin, hypertension on losartan, presenting for evaluation of "feeling off ".  Noted to have a blood sugar of greater than 600 on initial fingerstick.  She has not been having any fevers or chills that she has noticed, no cough congestion sore throat or runny nose.  No diarrhea, no urinary symptoms.  States she has been in the ICU with DKA 3 times in the past, intubated once approximately a year ago.  Notes that she sometimes forgets to do her insulin as she self injects, is not sure if she has missed any doses recently, states she is busy at home with two children and doesn't recall.   (03 Feb 2025 16:46)    Overnight/ Am events:  Patient seen and examined at bedside. No acute events overnight. Patient states she is feeling much better, no longer having episodes of vomiting. Also reports non compliance with meds at times because she forgets as she has twins. Also reported that she has a history of 1-2x a year being admitted for DKA. She wants to also see a nutritionist to better understand what she should and should not eat.       PAST MEDICAL  DM (diabetes mellitus) type 1   Hypertension   Hyperlipidemia     Past surgical history:  History of tonsillectomy/ adenoids removal     Social History:  Tabacco - denies   ETOH - social use at parties   Illicit drug abuse - denies    FAMILY HISTORY:  HTN- brother and mother     Allergies  No Known Allergies  Intolerances    HOME MEDICATIONS :   Losartan 100mg po qd   basaglar 10 u sq qhs   humalog 8-9 u sq achs tid       REVIEW OF SYSTEMS:  CONSTITUTIONAL: No fever, weight loss, or fatigue  EYES: No eye pain, visual disturbances, or discharge  NECK: No pain or stiffness  RESPIRATORY: No cough, wheezing, chills or hemoptysis; No shortness of breath  CARDIOVASCULAR: No chest pain, palpitations, dizziness, or leg swelling  GASTROINTESTINAL: +N/vomiting and abd pain   GENITOURINARY: No dysuria, frequency, hematuria, or incontinence  NEUROLOGICAL: No headaches, memory loss, loss of strength, numbness, or tremors  SKIN: No itching, burning, rashes, or lesions   ENDOCRINE: No heat or cold intolerance; No hair loss  PSYCHIATRIC: No depression, anxiety, mood swings, or difficulty sleeping  HEME/LYMPH: No easy bruising, or bleeding gums  ALLERGY AND IMMUNOLOGIC: No hives or eczema    MEDICATIONS  (STANDING):  chlorhexidine 2% Cloths 1 Application(s) Topical <User Schedule>  dextrose 5%. 1000 milliLiter(s) (100 mL/Hr) IV Continuous <Continuous>  dextrose 5%. 1000 milliLiter(s) (50 mL/Hr) IV Continuous <Continuous>  dextrose 50% Injectable 25 Gram(s) IV Push once  dextrose 50% Injectable 12.5 Gram(s) IV Push once  dextrose 50% Injectable 25 Gram(s) IV Push once  enoxaparin Injectable 40 milliGRAM(s) SubCutaneous every 24 hours  glucagon  Injectable 1 milliGRAM(s) IntraMuscular once  influenza   Vaccine 0.5 milliLiter(s) IntraMuscular once  insulin lispro (ADMELOG) corrective regimen sliding scale   SubCutaneous three times a day before meals  insulin lispro Injectable (ADMELOG) 8 Unit(s) SubCutaneous three times a day before meals    MEDICATIONS  (PRN):  acetaminophen     Tablet .. 650 milliGRAM(s) Oral every 6 hours PRN Temp greater or equal to 38C (100.4F), Mild Pain (1 - 3)  dextrose Oral Gel 15 Gram(s) Oral once PRN Blood Glucose LESS THAN 70 milliGRAM(s)/deciliter      Vital Signs Last 24 Hrs  T(C): 36.7 (04 Feb 2025 08:00), Max: 37 (04 Feb 2025 05:00)  T(F): 98 (04 Feb 2025 08:00), Max: 98.6 (04 Feb 2025 05:00)  HR: 82 (04 Feb 2025 11:00) (82 - 111)  BP: 116/79 (04 Feb 2025 11:00) (96/60 - 119/74)  BP(mean): 91 (04 Feb 2025 11:00) (72 - 96)  RR: 18 (04 Feb 2025 11:00) (16 - 23)  SpO2: 100% (04 Feb 2025 11:00) (96% - 100%)    Parameters below as of 04 Feb 2025 11:00  Patient On (Oxygen Delivery Method): room air    PHYSICAL EXAM:  GENERAL: NAD, well-groomed   EYES: EOMI, PERRLA, conjunctiva and sclera clear  NECK: Supple, No JVD   NERVOUS SYSTEM:  Alert & Oriented X3, Good concentration; Motor Strength 5/5 B/L upper and lower extremities   CHEST/LUNG: CTA  b/l,  no rales, rhonchi, wheezing, or rubs  HEART: Regular rate and rhythm; No murmurs, rubs, or gallops, no edema   ABDOMEN: Soft, Nontender, Nondistended; Bowel sounds present  EXTREMITIES:  2+ Peripheral Pulses, No clubbing or cyanosis   SKIN: No rashes or lesions    LABS:                        11.0   12.73 )-----------( 244      ( 04 Feb 2025 01:55 )             32.3     02-04    135  |  104  |  12.5  ----------------------------<  205[H]  4.0   |  19.0[L]  |  0.61    Ca    8.1[L]      04 Feb 2025 10:20  Phos  2.6     02-04  Mg     2.2     02-04    TPro  5.9[L]  /  Alb  3.3  /  TBili  0.4  /  DBili  x   /  AST  22  /  ALT  13  /  AlkPhos  80  02-04    PT/INR - ( 03 Feb 2025 12:41 )   PT: 11.8 sec;   INR: 1.02 ratio         PTT - ( 03 Feb 2025 12:41 )  PTT:27.1 sec  Urinalysis Basic - ( 04 Feb 2025 10:20 )    Color: x / Appearance: x / SG: x / pH: x  Gluc: 205 mg/dL / Ketone: x  / Bili: x / Urobili: x   Blood: x / Protein: x / Nitrite: x   Leuk Esterase: x / RBC: x / WBC x   Sq Epi: x / Non Sq Epi: x / Bacteria: x    RADIOLOGY & ADDITIONAL STUDIES:  Imaging reviewed and noted to be negative

## 2025-02-04 NOTE — CONSULT NOTE ADULT - ASSESSMENT
39 year old female with history of migraine, HLD, HTN and type 1 diabetes on insulin, prior hospitalizations for DKA due to non compliance with medications. Recently with sinusitis s/p abx tx not feeling well, with non compliance with insulin and admitted to the MICU on 2/3 with acute DKA. S/p insulin drip now with improving glucose and transferred to medicine. Endocrine consulted for uncontrolled T1DM    1. Uncontrolled T1DM (A1c 13.8%, multiple admission in past for DKA) due to non-adherence with insulin  - admitted with DKA s/p MICU admission s/p insulin gtt and IV hydration  - started Lantus 16 units daily (1st dose given this am) and Admelog 8 units premeals with AISS  - changes Lantus dosing to daily instead of HS  - monitor sugars and if stable can likely go home tomorrow  - counseled pt on adherence with insulin regimen and diabetic at home  - she would benefit with outpt follow up in our office for diabetes management    2. HLD - consider statin, outpt follow up  3. HTN - losartan on hold and BP okay today 39 year old female with history of migraine, HLD, HTN and type 1 diabetes on insulin, prior hospitalizations for DKA due to non compliance with medications. Recently with sinusitis s/p abx tx not feeling well, with non compliance with insulin and admitted to the MICU on 2/3 with acute DKA. S/p insulin drip now with improving glucose and transferred to medicine. Endocrine consulted for uncontrolled T1DM    1. Uncontrolled T1DM (A1c 13.8%, multiple admission in past for DKA) due to non-adherence with insulin  - admitted with DKA s/p MICU admission s/p insulin gtt and IV hydration  - started Lantus 16 units daily (1st dose given this am) and Admelog 8 units premeals with AISS  - changes Lantus dosing to daily instead of HS  - monitor sugars and if stable can likely go home tomorrow  - counseled pt on adherence with insulin regimen and diabetic at home  - she would benefit with outpt follow up in our office for diabetes management, office phone number     2. HLD - consider statin, outpt follow up  3. HTN - losartan on hold and BP okay today

## 2025-02-04 NOTE — CHART NOTE - NSCHARTNOTEFT_GEN_A_CORE
Appointment made for endocrine follow up  2/20/25 8 am with HILDA Moreau  [address] [facility]

## 2025-02-04 NOTE — CONSULT NOTE ADULT - TIME BILLING
Time spent reviewing the chart documentation, reviewing labs and imaging studies, evaluating the patient, discussing the plan of care with the consultants & medical team, and documenting. Time spent reviewing the chart documentation, reviewing labs and imaging studies, evaluating the patient, discussing the plan of care with the consultants & medical team, and documenting. Educating and also counselling the patient.

## 2025-02-04 NOTE — CONSULT NOTE ADULT - ASSESSMENT
39 year old female with history of migraine, HLD, HTN and type 1 diabetes on insulin, prior hospitalizations for DKA due to non compliance with medications. Recently with sinusitis s/p abx tx not feeling well, with non compliance with insulin and admitted to the MICU on 2/3 with acute DKA. S/p insulin drip now with improving glucose and transferred to medicine.       DKA   in the setting of DM1 2/2 non compliance with medications   HBA1C: 13.8   - fs improving 200 range   - AG closed   s/p insulin drip   -started on lantus 16 u sq qhs (home dose is 10u sq qhs)  - one dose given   - started ademlog 8u sq achs tid + low dose sliding scale    - patient requesting nutrition consult- extensive education on compliance with meds and diet   -nutrition consulted  - Diabetes educator   - endocrine consulted     HTN  - bp stable but at times with hypotension   - pt reports being on losartan 100mg po qd but has been held since admission due to low/normal bp  -c/w monitoring bp - anti hypertensive may be too much for the patient    HLD  - c/w diet control     Migraine headache  -c/w tylenol prn     DVT ppx  -c/w lovenox sq qd     Activity level: Increase as tolerated    Dispo: Anticiapted in 24 hours if pts glucose remains stable. Patient to return to home.  39 year old female with history of migraine, HLD, HTN and type 1 diabetes on insulin, prior hospitalizations for DKA due to non compliance with medications. Recently with sinusitis s/p abx tx not feeling well, with non compliance with insulin and admitted to the MICU on 2/3 with acute DKA. S/p insulin drip now with improving glucose and transferred to medicine.       DKA   in the setting of DM1 2/2 non compliance with medications   HBA1C: 13.8   - fs improving 180- 200 range   -s/p IVF   -s/p insulin drip   - serial bmps with improvement in AG now closed  - prior abgs noted   -started on lantus 16 u sq qhs (home dose is 10u sq qhs)  - one dose given   - started ademlog 8u sq achs tid + low dose sliding scale    - patient requesting nutrition consult- extensive education on compliance with meds and diet   -nutrition consulted  - Diabetes educator   - endocrine consulted     Leukocytosis   likely reactive or recovery from recent illness- pt reports being on abx for sinusitis - completion of augmentin   - no noted infectious source at this time   - UA and cxr negative   - f/u cbc in the am     HTN  - bp stable but at times with hypotension   - pt reports being on losartan 100mg po qd but has been held since admission due to low/normal bp  -c/w monitoring bp - anti hypertensive may be too much for the patient    HLD  - c/w diet control     Migraine headache  -c/w tylenol prn     DVT ppx  -c/w lovenox sq qd     Activity level: Increase as tolerated    Dispo: Anticiapted in 24 hours if pts glucose remains stable. Patient to return to home.  39 year old female with history of migraine, HLD, HTN and type 1 diabetes on insulin, prior hospitalizations for DKA due to non compliance with medications. Recently with sinusitis s/p abx tx not feeling well, with non compliance with insulin and admitted to the MICU on 2/3 with acute DKA. S/p insulin drip now with improving glucose and transferred to medicine.       DKA   in the setting of DM1 2/2 non compliance with medications   HBA1C: 13.8   - fs improving 180- 200 range   -s/p IVF   -s/p insulin drip   - serial bmps with improvement in AG now closed  - prior abgs noted   -started on lantus 16 u sq qhs changed to am as pt received one dose this am    - started ademlog 8u sq achs tid + low dose sliding scale    - patient requesting nutrition consult- extensive education on compliance with meds and diet   -nutrition consulted  - Diabetes educator   - endocrine consult noted and appreciated     Leukocytosis   likely reactive or recovery from recent illness- pt reports being on abx for sinusitis - completion of augmentin   - no noted infectious source at this time   - UA and cxr negative   - f/u cbc in the am     HTN  - bp stable but at times with hypotension   - pt reports being on losartan 100mg po qd but has been held since admission due to low/normal bp  -c/w monitoring bp - anti hypertensive may be too much for the patient    HLD  - c/w diet control     Migraine headache  -c/w tylenol prn     DVT ppx  -c/w lovenox sq qd     Activity level: Increase as tolerated    Dispo: Anticiapted in 24 hours if pts glucose remains stable. Patient to return to home.

## 2025-02-04 NOTE — PROGRESS NOTE ADULT - ASSESSMENT
ICU ASSESSMENT AND PLAN:   40yo F with PMH: type 1 diabetes on insulin, HTN, presenting for evaluation of "feeling off ".    Fingerstick in ED>600  She has not been having any fevers or chills that she has noticed, no cough congestion sore throat or runny nose.  No diarrhea, no urinary symptoms.  States she has been in the ICU with DKA 3 times in the past, intubated once approximately a year ago.    Notes that she sometimes forgets to do her insulin as she self injects, is not sure if she has missed any doses recently, states she is busy at home with two children and doesn't recall.  Also noted that her continuous monitor  a few days ago and didn't get around to changing it. States at home she gives herself Basalglar 10u at bedtime and usually Humalog 8u with meals, sometimes she give 9 or 10 units based on her meals.    Events last 24 hours:   Placed on insulin infusion overnight with improvement in her overall condition and lab values, she was transitioned this morning to Lantus 16 and 8u with meals plus sliding scale.   Her anion gap has closed and bicarb has steadily improved, now 19, deemed stable for downgrade to medical service.   Endocrine consult has been called.    Case discussed with Dr. Prosper Garcia, ICU Attending  Case discussed/signed out with Dr. Sena Gipson, Hospitalist

## 2025-02-04 NOTE — PROGRESS NOTE ADULT - SUBJECTIVE AND OBJECTIVE BOX
Patient is a 39y old  Female who presents with a chief complaint of DKA, type 1 DM (2025 16:46)    BRIEF HOSPITAL COURSE:   40yo F with PMH: type 1 diabetes on insulin, HTN, presenting for evaluation of "feeling off ".    Fingerstick in ED>600  She has not been having any fevers or chills that she has noticed, no cough congestion sore throat or runny nose.  No diarrhea, no urinary symptoms.  States she has been in the ICU with DKA 3 times in the past, intubated once approximately a year ago.    Notes that she sometimes forgets to do her insulin as she self injects, is not sure if she has missed any doses recently, states she is busy at home with two children and doesn't recall.  Also noted that her continuous monitor  a few days ago and didn't get around to changing it. States at home she gives herself Basalglar 10u at bedtime and usually Humalog 8u with meals, sometimes she give 9 or 10 units based on her meals.      Events last 24 hours:   Placed on insulin infusion overnight with improvement in her overall condition and lab values, she was transitioned this morning to Lantus 16 and 8u with meals plus sliding scale.     PAST MEDICAL & SURGICAL HISTORY:  DM (diabetes mellitus)  History of tonsillectomy    Review of Systems: Feeling much better  CONSTITUTIONAL: No fever, chills, or fatigue  EYES: No eye pain, visual disturbances, or discharge  ENMT:  No difficulty hearing, tinnitus, vertigo; No sinus or throat pain  NECK: No pain or stiffness  RESPIRATORY: No cough, wheezing, chills or hemoptysis; No shortness of breath  CARDIOVASCULAR: No chest pain, palpitations, dizziness, or leg swelling  GASTROINTESTINAL: No abdominal or epigastric pain. No nausea, vomiting, or hematemesis; No diarrhea or constipation. No melena or hematochezia.  GENITOURINARY: No dysuria, frequency, hematuria, or incontinence  NEUROLOGICAL: No headaches, memory loss, loss of strength, numbness, or tremors  SKIN: No itching, burning, rashes, or lesions   MUSCULOSKELETAL: No joint pain or swelling; No muscle, back, or extremity pain  PSYCHIATRIC: No depression, anxiety, mood swings, or difficulty sleeping      Medications:  enoxaparin Injectable 40 milliGRAM(s) SubCutaneous every 24 hours    dextrose 50% Injectable 25 Gram(s) IV Push once  dextrose 50% Injectable 12.5 Gram(s) IV Push once  dextrose 50% Injectable 25 Gram(s) IV Push once  dextrose Oral Gel 15 Gram(s) Oral once PRN  glucagon  Injectable 1 milliGRAM(s) IntraMuscular once  insulin lispro (ADMELOG) corrective regimen sliding scale   SubCutaneous three times a day before meals  insulin lispro Injectable (ADMELOG) 8 Unit(s) SubCutaneous three times a day before meals    dextrose 5%. 1000 milliLiter(s) IV Continuous <Continuous>  dextrose 5%. 1000 milliLiter(s) IV Continuous <Continuous>    influenza   Vaccine 0.5 milliLiter(s) IntraMuscular once    chlorhexidine 2% Cloths 1 Application(s) Topical <User Schedule>    ICU Vital Signs Last 24 Hrs  T(C): 36.7 (2025 08:00), Max: 37 (2025 05:00)  T(F): 98 (2025 08:00), Max: 98.6 (2025 05:00)  HR: 95 (2025 10:00) (86 - 111)  BP: 99/67 (2025 10:00) (96/60 - 119/74)  BP(mean): 78 (2025 10:00) (72 - 96)  ABP: --  ABP(mean): --  RR: 18 (2025 10:00) (16 - 23)  SpO2: 100% (2025 10:00) (96% - 100%)    O2 Parameters below as of 2025 10:00  Patient On (Oxygen Delivery Method): room air    I&O's Detail    2025 07:01  -  2025 07:00  --------------------------------------------------------  IN:    dextrose 5% + lactated ringers: 2100 mL    dextrose 5% + sodium chloride 0.45%: 100 mL    Insulin: 12 mL    Insulin: 15 mL    IV PiggyBack: 50 mL    Lactated Ringers: 100 mL    Lactated Ringers Bolus: 1000 mL    Oral Fluid: 100 mL  Total IN: 3477 mL    OUT:    Voided (mL): 800 mL  Total OUT: 800 mL    Total NET: 2677 mL      LABS:                        11.0   12.73 )-----------( 244      ( 2025 01:55 )             32.3     02-04    133[L]  |  103  |  14.7  ----------------------------<  246[H]  4.2   |  17.0[L]  |  0.66    Ca    8.4      2025 05:30  Phos  2.6     02-04  Mg     2.2     02-04    TPro  6.0[L]  /  Alb  3.3  /  TBili  0.3[L]  /  DBili  x   /  AST  23  /  ALT  14  /  AlkPhos  78  02-04    VBG done 05:30 with lactate 0.8, pH 7.35, Bicarb 16    Repeat CMP pending, however glucoses improved on fingersticks and patient feels much improved    Hemoglobin A1C 13.8, with average glucose of 349    CAPILLARY BLOOD GLUCOSE 213 (2025 20:48)  POCT Blood Glucose.: 186 mg/dL (2025 09:35)    PT/INR - ( 2025 12:41 )   PT: 11.8 sec;   INR: 1.02 ratio    PTT - ( 2025 12:41 )  PTT:27.1 sec    Urinalysis Basic - ( 2025 05:30 )  Color: x / Appearance: x / SG: x / pH: x  Gluc: 246 mg/dL / Ketone: x  / Bili: x / Urobili: x   Blood: x / Protein: x / Nitrite: x   Leuk Esterase: x / RBC: x / WBC x   Sq Epi: x / Non Sq Epi: x / Bacteria: x      Physical Examination:  General: No acute distress.    HEENT: Pupils equal, reactive to light.  Symmetric.  PULM: Clear to auscultation bilaterally, no significant sputum production  NECK: Supple, no lymphadenopathy, trachea midline  CVS: Regular rate and rhythm, no murmurs, rubs, or gallops  ABD: Soft, nondistended, nontender, normoactive bowel sounds, no masses  EXT: No edema, nontender  SKIN: Warm and well perfused, no rashes noted.  NEURO: Alert, oriented, interactive, nonfocal    RADIOLOGY:   CXR: Clear lungs    CRITICAL CARE TIME SPENT: 35 min

## 2025-02-05 ENCOUNTER — TRANSCRIPTION ENCOUNTER (OUTPATIENT)
Age: 40
End: 2025-02-05

## 2025-02-05 VITALS
HEART RATE: 64 BPM | RESPIRATION RATE: 18 BRPM | DIASTOLIC BLOOD PRESSURE: 86 MMHG | SYSTOLIC BLOOD PRESSURE: 137 MMHG | TEMPERATURE: 98 F | OXYGEN SATURATION: 98 %

## 2025-02-05 LAB
ANION GAP SERPL CALC-SCNC: 14 MMOL/L — SIGNIFICANT CHANGE UP (ref 5–17)
ANISOCYTOSIS BLD QL: SLIGHT — SIGNIFICANT CHANGE UP
BASOPHILS # BLD AUTO: 0 K/UL — SIGNIFICANT CHANGE UP (ref 0–0.2)
BASOPHILS NFR BLD AUTO: 0 % — SIGNIFICANT CHANGE UP (ref 0–2)
BUN SERPL-MCNC: 10.1 MG/DL — SIGNIFICANT CHANGE UP (ref 8–20)
CALCIUM SERPL-MCNC: 7.8 MG/DL — LOW (ref 8.4–10.5)
CHLORIDE SERPL-SCNC: 106 MMOL/L — SIGNIFICANT CHANGE UP (ref 96–108)
CO2 SERPL-SCNC: 20 MMOL/L — LOW (ref 22–29)
CREAT SERPL-MCNC: 0.43 MG/DL — LOW (ref 0.5–1.3)
EGFR: 125 ML/MIN/1.73M2 — SIGNIFICANT CHANGE UP
EOSINOPHIL # BLD AUTO: 0.15 K/UL — SIGNIFICANT CHANGE UP (ref 0–0.5)
EOSINOPHIL NFR BLD AUTO: 3.5 % — SIGNIFICANT CHANGE UP (ref 0–6)
GLUCOSE BLDC GLUCOMTR-MCNC: 193 MG/DL — HIGH (ref 70–99)
GLUCOSE SERPL-MCNC: 144 MG/DL — HIGH (ref 70–99)
HCT VFR BLD CALC: 32.5 % — LOW (ref 34.5–45)
HGB BLD-MCNC: 10.9 G/DL — LOW (ref 11.5–15.5)
HYPOCHROMIA BLD QL: SIGNIFICANT CHANGE UP
LYMPHOCYTES # BLD AUTO: 2.67 K/UL — SIGNIFICANT CHANGE UP (ref 1–3.3)
LYMPHOCYTES # BLD AUTO: 63.2 % — HIGH (ref 13–44)
MANUAL SMEAR VERIFICATION: SIGNIFICANT CHANGE UP
MCHC RBC-ENTMCNC: 31 PG — SIGNIFICANT CHANGE UP (ref 27–34)
MCHC RBC-ENTMCNC: 33.5 G/DL — SIGNIFICANT CHANGE UP (ref 32–36)
MCV RBC AUTO: 92.3 FL — SIGNIFICANT CHANGE UP (ref 80–100)
MONOCYTES # BLD AUTO: 0.07 K/UL — SIGNIFICANT CHANGE UP (ref 0–0.9)
MONOCYTES NFR BLD AUTO: 1.7 % — LOW (ref 2–14)
NEUTROPHILS # BLD AUTO: 1.26 K/UL — LOW (ref 1.8–7.4)
NEUTROPHILS NFR BLD AUTO: 29.8 % — LOW (ref 43–77)
OVALOCYTES BLD QL SMEAR: SLIGHT — SIGNIFICANT CHANGE UP
PLAT MORPH BLD: NORMAL — SIGNIFICANT CHANGE UP
PLATELET # BLD AUTO: 220 K/UL — SIGNIFICANT CHANGE UP (ref 150–400)
POIKILOCYTOSIS BLD QL AUTO: SLIGHT — SIGNIFICANT CHANGE UP
POLYCHROMASIA BLD QL SMEAR: SLIGHT — SIGNIFICANT CHANGE UP
POTASSIUM SERPL-MCNC: 4 MMOL/L — SIGNIFICANT CHANGE UP (ref 3.5–5.3)
POTASSIUM SERPL-SCNC: 4 MMOL/L — SIGNIFICANT CHANGE UP (ref 3.5–5.3)
RBC # BLD: 3.52 M/UL — LOW (ref 3.8–5.2)
RBC # FLD: 11.9 % — SIGNIFICANT CHANGE UP (ref 10.3–14.5)
RBC BLD AUTO: ABNORMAL
SODIUM SERPL-SCNC: 139 MMOL/L — SIGNIFICANT CHANGE UP (ref 135–145)
STOMATOCYTES BLD QL SMEAR: SIGNIFICANT CHANGE UP
VARIANT LYMPHS # BLD: 1.8 % — SIGNIFICANT CHANGE UP (ref 0–6)
VARIANT LYMPHS NFR BLD MANUAL: 1.8 % — SIGNIFICANT CHANGE UP (ref 0–6)
WBC # BLD: 4.23 K/UL — SIGNIFICANT CHANGE UP (ref 3.8–10.5)
WBC # FLD AUTO: 4.23 K/UL — SIGNIFICANT CHANGE UP (ref 3.8–10.5)

## 2025-02-05 PROCEDURE — 82330 ASSAY OF CALCIUM: CPT

## 2025-02-05 PROCEDURE — 81001 URINALYSIS AUTO W/SCOPE: CPT

## 2025-02-05 PROCEDURE — 82435 ASSAY OF BLOOD CHLORIDE: CPT

## 2025-02-05 PROCEDURE — 99239 HOSP IP/OBS DSCHRG MGMT >30: CPT

## 2025-02-05 PROCEDURE — 85018 HEMOGLOBIN: CPT

## 2025-02-05 PROCEDURE — 85027 COMPLETE CBC AUTOMATED: CPT

## 2025-02-05 PROCEDURE — 85025 COMPLETE CBC W/AUTO DIFF WBC: CPT

## 2025-02-05 PROCEDURE — 84702 CHORIONIC GONADOTROPIN TEST: CPT

## 2025-02-05 PROCEDURE — 85730 THROMBOPLASTIN TIME PARTIAL: CPT

## 2025-02-05 PROCEDURE — 85014 HEMATOCRIT: CPT

## 2025-02-05 PROCEDURE — 83036 HEMOGLOBIN GLYCOSYLATED A1C: CPT

## 2025-02-05 PROCEDURE — 82009 KETONE BODYS QUAL: CPT

## 2025-02-05 PROCEDURE — 82803 BLOOD GASES ANY COMBINATION: CPT

## 2025-02-05 PROCEDURE — 85610 PROTHROMBIN TIME: CPT

## 2025-02-05 PROCEDURE — 80048 BASIC METABOLIC PNL TOTAL CA: CPT

## 2025-02-05 PROCEDURE — 82947 ASSAY GLUCOSE BLOOD QUANT: CPT

## 2025-02-05 PROCEDURE — 87637 SARSCOV2&INF A&B&RSV AMP PRB: CPT

## 2025-02-05 PROCEDURE — 93005 ELECTROCARDIOGRAM TRACING: CPT

## 2025-02-05 PROCEDURE — 87086 URINE CULTURE/COLONY COUNT: CPT

## 2025-02-05 PROCEDURE — 71046 X-RAY EXAM CHEST 2 VIEWS: CPT

## 2025-02-05 PROCEDURE — 99285 EMERGENCY DEPT VISIT HI MDM: CPT

## 2025-02-05 PROCEDURE — 36415 COLL VENOUS BLD VENIPUNCTURE: CPT

## 2025-02-05 PROCEDURE — 96374 THER/PROPH/DIAG INJ IV PUSH: CPT

## 2025-02-05 PROCEDURE — 82962 GLUCOSE BLOOD TEST: CPT

## 2025-02-05 PROCEDURE — 83690 ASSAY OF LIPASE: CPT

## 2025-02-05 PROCEDURE — 84295 ASSAY OF SERUM SODIUM: CPT

## 2025-02-05 PROCEDURE — 84100 ASSAY OF PHOSPHORUS: CPT

## 2025-02-05 PROCEDURE — 83605 ASSAY OF LACTIC ACID: CPT

## 2025-02-05 PROCEDURE — 83735 ASSAY OF MAGNESIUM: CPT

## 2025-02-05 PROCEDURE — 84132 ASSAY OF SERUM POTASSIUM: CPT

## 2025-02-05 PROCEDURE — 80053 COMPREHEN METABOLIC PANEL: CPT

## 2025-02-05 RX ORDER — LOSARTAN POTASSIUM 100 MG
1 TABLET ORAL
Refills: 0 | DISCHARGE

## 2025-02-05 RX ORDER — ISOPROPYL ALCOHOL, BENZOCAINE .7; .06 ML/ML; ML/ML
0 SWAB TOPICAL
Qty: 100 | Refills: 1
Start: 2025-02-05

## 2025-02-05 RX ORDER — INSULIN GLARGINE-YFGN 100 [IU]/ML
16 INJECTION, SOLUTION SUBCUTANEOUS
Qty: 5 | Refills: 0
Start: 2025-02-05 | End: 2025-03-06

## 2025-02-05 RX ORDER — INSULIN LISPRO 100/ML
8 VIAL (ML) SUBCUTANEOUS
Qty: 5 | Refills: 0
Start: 2025-02-05 | End: 2025-03-06

## 2025-02-05 RX ADMIN — ANTISEPTIC SURGICAL SCRUB 1 APPLICATION(S): 0.04 SOLUTION TOPICAL at 06:23

## 2025-02-05 RX ADMIN — INSULIN GLARGINE-YFGN 16 UNIT(S): 100 INJECTION, SOLUTION SUBCUTANEOUS at 08:17

## 2025-02-05 RX ADMIN — Medication 1: at 08:17

## 2025-02-05 RX ADMIN — Medication 8 UNIT(S): at 08:16

## 2025-02-05 NOTE — DISCHARGE NOTE PROVIDER - NSDCMRMEDTOKEN_GEN_ALL_CORE_FT
HumaLOG 100 units/mL subcutaneous solution: 8 unit(s) subcutaneous 3 times a day (before meals)  insulin glargine 100 units/mL subcutaneous solution: 14 unit(s) subcutaneous once a day  losartan 100 mg oral tablet: 1 tab(s) orally once a day   Admelog SoloStar 100 units/mL injectable solution: 8 unit(s) injectable 3 times a day (before meals)  alcohol swabs: Apply topically to affected area 4 times a day  glucometer (per patient&#x27;s insurance): Test blood sugars four times a day. Dispense #1 glucometer.  insulin glargine 100 units/mL subcutaneous solution: 16 unit(s) subcutaneous once a day (at bedtime)   Admelog SoloStar 100 units/mL injectable solution: 8 unit(s) injectable 3 times a day (before meals)  insulin glargine 100 units/mL subcutaneous solution: 16 unit(s) subcutaneous once a day (at bedtime)

## 2025-02-05 NOTE — DISCHARGE NOTE PROVIDER - NSDCCPCAREPLAN_GEN_ALL_CORE_FT
PRINCIPAL DISCHARGE DIAGNOSIS  Diagnosis: DKA (diabetic ketoacidosis)  Assessment and Plan of Treatment: Continue with diabetic medication regimen. Please follow up with endocrinologist with scheduled appointment.      SECONDARY DISCHARGE DIAGNOSES  Diagnosis: Hypertension  Assessment and Plan of Treatment: Continue monitoring your blood pressure.    Diagnosis: Hyperlipidemia  Assessment and Plan of Treatment: Continue low cholesterol diet.

## 2025-02-05 NOTE — DISCHARGE NOTE PROVIDER - HOSPITAL COURSE
39 year old female with history of migraine, HLD, HTN and type 1 diabetes on insulin, prior hospitalizations for DKA due to non compliance with medications. Recently with sinusitis s/p abx tx not feeling well, with non compliance with insulin and admitted to the MICU on 2/3 with acute DKA. S/p IVF, insulin drip now with improving glucose and transferred to medicine. Endocrine consulted and modified lantus regimen. Nutrition consulted and patient educated on diet and medication compliance.   Patient to follow up with primary care physician in 1 week and endocrine scheduled appt on 2/20.

## 2025-02-05 NOTE — DISCHARGE NOTE PROVIDER - DISCHARGE SERVICE FOR PATIENT
Encounter Date: 2/14/2020       History     Chief Complaint   Patient presents with    Diarrhea     diarrhea onset yesterday, fatigue, 2/11 had RIGO lower back      Generally healthy, has a history of left-sided sciatica for which she underwent her initial epidural lumbar spinal infection 3 days ago without difficulty.  Takes few medications, no recent antibiotics, no exposure to ill persons, well water, outdoors, small children, or any other known likely source of infection.  Diarrhea onset 2 days ago in the middle of the day, multiple episodes per day, watery, associated with decreased oral intake of food and fluids.  No nausea, vomiting, fever, or localizing pain. She did feel she had some abdominal swelling and distention associated with some unusual foul belching, those things have improved.  Diarrhea has improved, last episode was early this morning.  No sign of bleeding.  Previous abdominal or pelvic surgeries limited to tubal ligation and hysterectomy many years ago.  She has taken very few medications recently, did take a dose of Tylenol p.m. 2 or 3 days ago and a dose of an over-the-counter antidiarrheal medicine 1 or 2 days ago.  Here with .  No other complaints.    The history is provided by the patient and the spouse. No  was used.     Review of patient's allergies indicates:  No Known Allergies  History reviewed. No pertinent past medical history.  No past surgical history on file.  History reviewed. No pertinent family history.  Social History     Tobacco Use    Smoking status: Not on file   Substance Use Topics    Alcohol use: Not on file    Drug use: Not on file     Review of Systems   Constitutional: Positive for appetite change. Negative for activity change, fatigue and fever.   HENT: Negative for congestion, ear pain, facial swelling, nosebleeds, sinus pressure and sore throat.    Eyes: Negative for pain, discharge, redness and visual disturbance.   Respiratory:  on the discharge service for the patient. I have reviewed and made amendments to the documentation where necessary. Negative for cough, choking, chest tightness, shortness of breath and wheezing.    Cardiovascular: Negative for chest pain, palpitations and leg swelling.   Gastrointestinal: Positive for abdominal distention and diarrhea. Negative for abdominal pain, nausea and vomiting.   Endocrine: Negative for heat intolerance, polydipsia and polyuria.   Genitourinary: Negative for difficulty urinating, dysuria, flank pain, hematuria and urgency.   Musculoskeletal: Negative for back pain, gait problem, joint swelling and myalgias.   Skin: Negative for color change and rash.   Allergic/Immunologic: Negative for environmental allergies and food allergies.   Neurological: Negative for dizziness, weakness, numbness and headaches.   Hematological: Negative for adenopathy. Does not bruise/bleed easily.   Psychiatric/Behavioral: Negative for agitation and behavioral problems. The patient is not nervous/anxious.    All other systems reviewed and are negative.      Physical Exam     Initial Vitals [02/14/20 0850]   BP Pulse Resp Temp SpO2   111/66 89 16 97.4 °F (36.3 °C) 98 %      MAP       --         Physical Exam    Nursing note and vitals reviewed.  Constitutional: She appears well-developed and well-nourished. She is not diaphoretic. No distress.   Mildly uncomfortable   HENT:   Head: Normocephalic and atraumatic.   Mouth/Throat: No oropharyngeal exudate.   Eyes: Conjunctivae and EOM are normal. Pupils are equal, round, and reactive to light. Right eye exhibits no discharge. Left eye exhibits no discharge. No scleral icterus.   Neck: Normal range of motion. Neck supple. No thyromegaly present. No tracheal deviation present. No JVD present.   Cardiovascular: Normal rate, regular rhythm, normal heart sounds and intact distal pulses. Exam reveals no gallop and no friction rub.    No murmur heard.  Pulmonary/Chest: Breath sounds normal. No stridor. No respiratory distress. She has no wheezes. She has no rhonchi. She has no rales. She  exhibits no tenderness.   Abdominal: Soft. Bowel sounds are normal. She exhibits no distension and no mass. There is no tenderness. There is no rebound and no guarding.   Musculoskeletal: Normal range of motion. She exhibits no edema or tenderness.   Neurological: She is alert and oriented to person, place, and time. She has normal strength.   Skin: Skin is warm and dry. No rash and no abscess noted. No erythema.   Psychiatric: She has a normal mood and affect. Her behavior is normal. Judgment and thought content normal.         ED Course   Procedures  Labs Reviewed   CBC W/ AUTO DIFFERENTIAL - Abnormal; Notable for the following components:       Result Value    WBC 14.43 (*)     RBC 5.54 (*)     Hemoglobin 16.1 (*)     Hematocrit 49.5 (*)     Immature Granulocytes 0.6 (*)     Gran # (ANC) 11.9 (*)     Immature Grans (Abs) 0.08 (*)     Mono # 1.1 (*)     Gran% 82.2 (*)     Lymph% 7.7 (*)     All other components within normal limits   COMPREHENSIVE METABOLIC PANEL - Abnormal; Notable for the following components:    CO2 21 (*)     Calcium 10.6 (*)     Total Protein 8.7 (*)     All other components within normal limits   URINALYSIS, REFLEX TO URINE CULTURE - Abnormal; Notable for the following components:    Appearance, UA Cloudy (*)     Specific Gravity, UA >=1.030 (*)     Protein, UA Trace (*)     Ketones, UA 1+ (*)     Bilirubin (UA) 1+ (*)     Occult Blood UA Trace (*)     All other components within normal limits    Narrative:     Preferred Collection Type->Urine, Clean Catch   LIPASE   URINALYSIS MICROSCOPIC    Narrative:     Preferred Collection Type->Urine, Clean Catch          Imaging Results          CT Abdomen Pelvis With Contrast (Final result)  Result time 02/14/20 13:01:43    Final result by Rajeev Billy MD (02/14/20 13:01:43)                 Impression:      1. Nonspecific fluid-filled nondilated loops of small bowel and right colon.  Findings raise the question of a gastroenteritis or other  diarrheal state.  2. Punctate cysts within the liver.  3. Cholelithiasis.  All CT scans at this facility are performed  using dose modulation techniques as appropriate to performed exam including the following:  automated exposure control; adjustment of mA and/or kV according to the patients size (this includes techniques or standardized protocols for targeted exams where dose is matched to indication/reason for exam: i.e. extremities or head);  iterative reconstruction technique.      Electronically signed by: Rajeev Billy  Date:    02/14/2020  Time:    13:01             Narrative:    EXAMINATION:  CT ABDOMEN PELVIS WITH CONTRAST    CLINICAL HISTORY:  Nausea, vomiting, diarrhea;    TECHNIQUE:  Standard axial imaging performed extending from the lung bases through the pubic symphysis, following administration of intravenous contrast.  Additional 2D  reformatted sagittal and coronal images obtained.    COMPARISON:  None    FINDINGS:  Mild atelectasis identified posterior right lung base.  Heart normal in size.    Mild fatty infiltration of the liver.  Punctate cyst identified of the junction of the right and left lobes near the dome, measuring 0.6 cm..  Additional punctate cyst within the caudate lobe.  Large partially calcified gallstones identified.  Negative for evidence of acute cholecystitis.  Portal vein is patent.    The spleen is normal in size and appearance.  The pancreas is normal.  The adrenal glands are normal.  The aorta and IVC are normal.  No retroperitoneal adenopathy.    The kidneys and ureters are normal, bilaterally.    The stomach is distended with oral contrast but otherwise is normal appearance.  Small intestine is normal in caliber with numerous fluid-filled loops.  Findings raise the question of enteritis or other diarrhea state.  A low lying cecum is identified within the pelvis.  Appendix could not be visualized.  No inflammatory changes in the region of the appendix.  Partially  fluid-filled loops of ascending colon also identified.  Small amount of fluid noted within the rectosigmoid colon.  Otherwise the colon is unremarkable.    The pelvis demonstrates normal-appearing bladder.  Uterus surgically absent.  Adnexal regions are normal..    Regional bones demonstrate degenerative changes of the spine.  No suspicious osseous lesions.  Abdominal and pelvic wall soft tissues are normal.                               X-Ray Abdomen Flat And Erect (Final result)  Result time 02/14/20 10:29:06    Final result by Torrey Wheatley MD (02/14/20 10:29:06)                 Impression:      Question few scattered fluid levels within the right hemiabdomen which could reflect local inflammation. CT with oral and IV contrast can be obtained as clinically warranted.      Electronically signed by: Torrey Wheatley MD  Date:    02/14/2020  Time:    10:29             Narrative:    EXAMINATION:  XR ABDOMEN FLAT AND ERECT    CLINICAL HISTORY:  Abdominal Pain;    COMPARISON:  None    FINDINGS:  Air-filled colon is noted.  Question few scattered fluid levels within the right hemiabdomen which could reflect local inflammation.  CT with oral and IV contrast can be obtained as clinically warranted.  Nonobstructive bowel gas pattern is noted. No radiopaque kidney calculus is identified.  There are multiple calcifications in the pelvis most consistent with phleboliths.  No evidence of organomegaly.  The bones demonstrate mild degenerative changes within the lower lumbar region.  The bones are otherwise intact.  Lung bases are clear.                              11:14 AM Stable, symptoms improved, feels better, fluids infusing.  Discussed with patient and  in detail, will CT to follow up plain film and clinical findings.    1:13 PM Improved, CT reviewed. Appropriate for outpatient management.    Counseled in detail.                                           Clinical Impression:     1. Viral gastroenteritis    2.  Diarrhea of presumed infectious origin         Disposition:   Disposition: Discharged  Condition: Stable                     Kranthi Christian MD  02/14/20 [id number]

## 2025-02-05 NOTE — PHARMACOTHERAPY INTERVENTION NOTE - OUTCOME
accepted
Detail Level: Zone
Initiate Treatment: Triamcinolone 0.1% cream BID x 2-3 weeks, patient reports she has this at home
Initiate Treatment: Efudex 5% cream BID to nose, forehead, Left cheek x 10-14 days

## 2025-02-05 NOTE — DISCHARGE NOTE NURSING/CASE MANAGEMENT/SOCIAL WORK - FINANCIAL ASSISTANCE
[facility] provides services at a reduced cost to those who are determined to be eligible through [facility]’s financial assistance program. Information regarding [facility]’s financial assistance program can be found by going to [email or web address] or by calling [phone].

## 2025-02-05 NOTE — DISCHARGE NOTE PROVIDER - NSDCFUADDAPPT_GEN_ALL_CORE_FT
Please follow up with endocrinologist Dr. High on 2/20/25. Please follow up with your primary care physician.  APPTS ARE READY TO BE MADE: [X] YES    Best Family or Patient Contact (if needed):    Additional Information about above appointments (if needed):    1: Within one week pcp- Dr. Mark Naidu   2:   3:     Other comments or requests:

## 2025-02-05 NOTE — DISCHARGE NOTE NURSING/CASE MANAGEMENT/SOCIAL WORK - PATIENT PORTAL LINK FT
You can access the FollowMyHealth Patient Portal offered by [facility] by registering at the following website: [email or web address]. By joining [employer]’s FollowMyHealth portal, you will also be able to view your health information using other applications (apps) compatible with our system.

## 2025-02-20 ENCOUNTER — APPOINTMENT (OUTPATIENT)
Dept: ENDOCRINOLOGY | Facility: CLINIC | Age: 40
End: 2025-02-20
Payer: MEDICAID

## 2025-02-20 VITALS
HEART RATE: 78 BPM | OXYGEN SATURATION: 99 % | BODY MASS INDEX: 24.75 KG/M2 | HEIGHT: 66 IN | WEIGHT: 154 LBS | SYSTOLIC BLOOD PRESSURE: 140 MMHG | DIASTOLIC BLOOD PRESSURE: 90 MMHG

## 2025-02-20 VITALS — SYSTOLIC BLOOD PRESSURE: 130 MMHG | DIASTOLIC BLOOD PRESSURE: 80 MMHG

## 2025-02-20 DIAGNOSIS — Z13.29 ENCOUNTER FOR SCREENING FOR OTHER SUSPECTED ENDOCRINE DISORDER: ICD-10-CM

## 2025-02-20 DIAGNOSIS — Z80.42 FAMILY HISTORY OF MALIGNANT NEOPLASM OF PROSTATE: ICD-10-CM

## 2025-02-20 DIAGNOSIS — Z82.49 FAMILY HISTORY OF ISCHEMIC HEART DISEASE AND OTHER DISEASES OF THE CIRCULATORY SYSTEM: ICD-10-CM

## 2025-02-20 DIAGNOSIS — I10 ESSENTIAL (PRIMARY) HYPERTENSION: ICD-10-CM

## 2025-02-20 DIAGNOSIS — Z13.220 ENCOUNTER FOR SCREENING FOR LIPOID DISORDERS: ICD-10-CM

## 2025-02-20 DIAGNOSIS — E10.65 TYPE 1 DIABETES MELLITUS WITH HYPERGLYCEMIA: ICD-10-CM

## 2025-02-20 LAB
GLUCOSE BLDC GLUCOMTR-MCNC: 160
HBA1C MFR BLD HPLC: 13.8

## 2025-02-20 PROCEDURE — 82962 GLUCOSE BLOOD TEST: CPT

## 2025-02-20 PROCEDURE — 95251 CONT GLUC MNTR ANALYSIS I&R: CPT

## 2025-02-20 PROCEDURE — 99215 OFFICE O/P EST HI 40 MIN: CPT | Mod: 25

## 2025-02-20 RX ORDER — BLOOD-GLUCOSE SENSOR
EACH MISCELLANEOUS
Refills: 0 | Status: ACTIVE | COMMUNITY

## 2025-02-20 RX ORDER — MULTIVITAMIN
TABLET ORAL
Refills: 0 | Status: ACTIVE | COMMUNITY

## 2025-04-08 ENCOUNTER — APPOINTMENT (OUTPATIENT)
Dept: ENDOCRINOLOGY | Facility: CLINIC | Age: 40
End: 2025-04-08
Payer: MEDICAID

## 2025-04-08 VITALS — HEART RATE: 94 BPM | DIASTOLIC BLOOD PRESSURE: 86 MMHG | OXYGEN SATURATION: 99 % | SYSTOLIC BLOOD PRESSURE: 128 MMHG

## 2025-04-08 DIAGNOSIS — Z13.220 ENCOUNTER FOR SCREENING FOR LIPOID DISORDERS: ICD-10-CM

## 2025-04-08 DIAGNOSIS — E10.65 TYPE 1 DIABETES MELLITUS WITH HYPERGLYCEMIA: ICD-10-CM

## 2025-04-08 DIAGNOSIS — I10 ESSENTIAL (PRIMARY) HYPERTENSION: ICD-10-CM

## 2025-04-08 PROCEDURE — 95251 CONT GLUC MNTR ANALYSIS I&R: CPT

## 2025-04-08 PROCEDURE — 99214 OFFICE O/P EST MOD 30 MIN: CPT | Mod: 25

## 2025-04-10 NOTE — ED PROVIDER NOTE - WET READ LAUNCH FT
Anesthesia Transfer of Care Note    Patient: Carmen Fletcher Pfluvivienne    Procedure(s) Performed: Procedure(s) (LRB):  EGD (ESOPHAGOGASTRODUODENOSCOPY) WITH KENALOG INJECTION (N/A)    Patient location: GI    Anesthesia Type: general    Transport from OR: Transported from OR on room air with adequate spontaneous ventilation    Post pain: adequate analgesia    Post assessment: no apparent anesthetic complications and tolerated procedure well    Post vital signs: stable    Level of consciousness: awake and alert    Nausea/Vomiting: no nausea/vomiting    Complications: none    Transfer of care protocol was followed    Last vitals: Visit Vitals  BP (!) 194/79 (BP Location: Left arm, Patient Position: Lying)   Pulse 88   Temp 36.7 °C (98.1 °F) (Oral)   Resp 18   LMP  (LMP Unknown)   SpO2 97%   Breastfeeding No      There are no Wet Read(s) to document.

## 2025-04-18 ENCOUNTER — LABORATORY RESULT (OUTPATIENT)
Age: 40
End: 2025-04-18

## 2025-04-18 LAB
ALBUMIN SERPL ELPH-MCNC: 3.8 G/DL
ALP BLD-CCNC: 59 U/L
ALT SERPL-CCNC: 19 U/L
ANION GAP SERPL CALC-SCNC: 12 MMOL/L
AST SERPL-CCNC: 17 U/L
BASOPHILS # BLD AUTO: 0.05 K/UL
BASOPHILS NFR BLD AUTO: 1.3 %
BILIRUB SERPL-MCNC: 0.4 MG/DL
BUN SERPL-MCNC: 12 MG/DL
CALCIUM SERPL-MCNC: 9 MG/DL
CHLORIDE SERPL-SCNC: 102 MMOL/L
CHOLEST SERPL-MCNC: 168 MG/DL
CO2 SERPL-SCNC: 22 MMOL/L
CREAT SERPL-MCNC: 0.6 MG/DL
CREAT SPEC-SCNC: 45 MG/DL
EGFRCR SERPLBLD CKD-EPI 2021: 116 ML/MIN/1.73M2
EOSINOPHIL # BLD AUTO: 0.12 K/UL
EOSINOPHIL NFR BLD AUTO: 3 %
ESTIMATED AVERAGE GLUCOSE: 203 MG/DL
GLUCOSE SERPL-MCNC: 261 MG/DL
HBA1C MFR BLD HPLC: 8.7 %
HCT VFR BLD CALC: 36.1 %
HDLC SERPL-MCNC: 82 MG/DL
HGB BLD-MCNC: 11.6 G/DL
IMM GRANULOCYTES NFR BLD AUTO: 0 %
LDLC SERPL-MCNC: 79 MG/DL
LYMPHOCYTES # BLD AUTO: 2.08 K/UL
LYMPHOCYTES NFR BLD AUTO: 52.5 %
MAN DIFF?: NORMAL
MCHC RBC-ENTMCNC: 30.4 PG
MCHC RBC-ENTMCNC: 32.1 G/DL
MCV RBC AUTO: 94.8 FL
MICROALBUMIN 24H UR DL<=1MG/L-MCNC: <1.2 MG/DL
MICROALBUMIN/CREAT 24H UR-RTO: NORMAL MG/G
MONOCYTES # BLD AUTO: 0.36 K/UL
MONOCYTES NFR BLD AUTO: 9.1 %
NEUTROPHILS # BLD AUTO: 1.35 K/UL
NEUTROPHILS NFR BLD AUTO: 34.1 %
NONHDLC SERPL-MCNC: 86 MG/DL
PLATELET # BLD AUTO: 266 K/UL
POTASSIUM SERPL-SCNC: 4.5 MMOL/L
PROT SERPL-MCNC: 6.5 G/DL
RBC # BLD: 3.81 M/UL
RBC # FLD: 12.1 %
SODIUM SERPL-SCNC: 136 MMOL/L
TRIGL SERPL-MCNC: 29 MG/DL
WBC # FLD AUTO: 3.96 K/UL

## 2025-04-25 RX ORDER — INSULIN LISPRO 100 [IU]/ML
100 INJECTION, SOLUTION INTRAVENOUS; SUBCUTANEOUS
Qty: 1 | Refills: 0 | Status: ACTIVE | COMMUNITY
Start: 2025-04-25 | End: 1900-01-01

## 2025-07-01 ENCOUNTER — APPOINTMENT (OUTPATIENT)
Dept: ENDOCRINOLOGY | Facility: CLINIC | Age: 40
End: 2025-07-01
Payer: MEDICAID

## 2025-07-01 VITALS
HEART RATE: 80 BPM | SYSTOLIC BLOOD PRESSURE: 124 MMHG | WEIGHT: 173 LBS | BODY MASS INDEX: 27.8 KG/M2 | DIASTOLIC BLOOD PRESSURE: 88 MMHG | OXYGEN SATURATION: 99 % | HEIGHT: 66 IN

## 2025-07-01 PROCEDURE — G2211 COMPLEX E/M VISIT ADD ON: CPT | Mod: NC

## 2025-07-01 PROCEDURE — 99214 OFFICE O/P EST MOD 30 MIN: CPT

## 2025-07-28 ENCOUNTER — NON-APPOINTMENT (OUTPATIENT)
Age: 40
End: 2025-07-28

## 2025-08-28 ENCOUNTER — APPOINTMENT (OUTPATIENT)
Dept: ENDOCRINOLOGY | Facility: CLINIC | Age: 40
End: 2025-08-28
Payer: MEDICAID

## 2025-08-28 DIAGNOSIS — E10.65 TYPE 1 DIABETES MELLITUS WITH HYPERGLYCEMIA: ICD-10-CM

## 2025-08-28 PROCEDURE — G0108 DIAB MANAGE TRN  PER INDIV: CPT
